# Patient Record
Sex: FEMALE | Race: WHITE | NOT HISPANIC OR LATINO | URBAN - METROPOLITAN AREA
[De-identification: names, ages, dates, MRNs, and addresses within clinical notes are randomized per-mention and may not be internally consistent; named-entity substitution may affect disease eponyms.]

---

## 2023-12-12 PROCEDURE — 99285 EMERGENCY DEPT VISIT HI MDM: CPT | Mod: 25,ER

## 2023-12-12 PROCEDURE — 80053 COMPREHEN METABOLIC PANEL: CPT | Mod: ER

## 2023-12-12 PROCEDURE — 96365 THER/PROPH/DIAG IV INF INIT: CPT | Mod: ER

## 2023-12-12 PROCEDURE — 85025 COMPLETE CBC W/AUTO DIFF WBC: CPT | Mod: ER

## 2023-12-13 ENCOUNTER — HOSPITAL ENCOUNTER (INPATIENT)
Facility: HOSPITAL | Age: 78
LOS: 2 days | Discharge: HOME HEALTH | DRG: 603 | End: 2023-12-17
Attending: EMERGENCY MEDICINE | Admitting: HOSPITALIST
Payer: MEDICARE

## 2023-12-13 DIAGNOSIS — R00.1 BRADYCARDIA WITH 41-50 BEATS PER MINUTE: ICD-10-CM

## 2023-12-13 DIAGNOSIS — R07.9 CHEST PAIN: ICD-10-CM

## 2023-12-13 DIAGNOSIS — I73.9 PAD (PERIPHERAL ARTERY DISEASE): ICD-10-CM

## 2023-12-13 DIAGNOSIS — L03.90 WOUND CELLULITIS: ICD-10-CM

## 2023-12-13 DIAGNOSIS — L03.116 BILATERAL LOWER LEG CELLULITIS: Primary | ICD-10-CM

## 2023-12-13 DIAGNOSIS — L03.115 BILATERAL LOWER LEG CELLULITIS: Primary | ICD-10-CM

## 2023-12-13 PROBLEM — I10 HTN (HYPERTENSION): Status: ACTIVE | Noted: 2023-12-13

## 2023-12-13 PROBLEM — R53.81 DEBILITY: Status: ACTIVE | Noted: 2023-12-13

## 2023-12-13 PROBLEM — H54.3 BLINDNESS, BILATERAL: Status: ACTIVE | Noted: 2023-12-13

## 2023-12-13 LAB
ALBUMIN SERPL BCP-MCNC: 3 G/DL (ref 3.5–5.2)
ALBUMIN SERPL-MCNC: 3.1 G/DL (ref 3.3–5.5)
ALP SERPL-CCNC: 101 U/L (ref 42–141)
ALP SERPL-CCNC: 98 U/L (ref 55–135)
ALT SERPL W/O P-5'-P-CCNC: 10 U/L (ref 10–44)
ANION GAP SERPL CALC-SCNC: 8 MMOL/L (ref 8–16)
ASCENDING AORTA: 2.92 CM
AST SERPL-CCNC: 17 U/L (ref 10–40)
AV INDEX (PROSTH): 0.56
AV MEAN GRADIENT: 9 MMHG
AV PEAK GRADIENT: 16 MMHG
AV VALVE AREA BY VELOCITY RATIO: 1.58 CM²
AV VALVE AREA: 1.74 CM²
AV VELOCITY RATIO: 0.5
BASOPHILS # BLD AUTO: 0.08 K/UL (ref 0–0.2)
BASOPHILS NFR BLD: 1.4 % (ref 0–1.9)
BILIRUB SERPL-MCNC: 0.3 MG/DL (ref 0.1–1)
BILIRUB SERPL-MCNC: 0.5 MG/DL (ref 0.2–1.6)
BSA FOR ECHO PROCEDURE: 1.64 M2
BUN SERPL-MCNC: 22 MG/DL (ref 8–23)
BUN SERPL-MCNC: 23 MG/DL (ref 7–22)
CALCIUM SERPL-MCNC: 8.3 MG/DL (ref 8.7–10.5)
CALCIUM SERPL-MCNC: 9 MG/DL (ref 8–10.3)
CHLORIDE SERPL-SCNC: 107 MMOL/L (ref 95–110)
CHLORIDE SERPL-SCNC: 107 MMOL/L (ref 98–108)
CO2 SERPL-SCNC: 28 MMOL/L (ref 23–29)
CREAT SERPL-MCNC: 0.8 MG/DL (ref 0.5–1.4)
CREAT SERPL-MCNC: 0.8 MG/DL (ref 0.6–1.2)
CV ECHO LV RWT: 0.41 CM
DIFFERENTIAL METHOD: NORMAL
DOP CALC AO PEAK VEL: 1.97 M/S
DOP CALC AO VTI: 47 CM
DOP CALC LVOT AREA: 3.1 CM2
DOP CALC LVOT DIAMETER: 2 CM
DOP CALC LVOT PEAK VEL: 0.99 M/S
DOP CALC LVOT STROKE VOLUME: 81.95 CM3
DOP CALCLVOT PEAK VEL VTI: 26.1 CM
E WAVE DECELERATION TIME: 254.71 MSEC
E/A RATIO: 1.33
E/E' RATIO: 13.86 M/S
ECHO LV POSTERIOR WALL: 0.81 CM (ref 0.6–1.1)
EOSINOPHIL # BLD AUTO: 0.2 K/UL (ref 0–0.5)
EOSINOPHIL NFR BLD: 2.8 % (ref 0–8)
ERYTHROCYTE [DISTWIDTH] IN BLOOD BY AUTOMATED COUNT: 13.4 % (ref 11.5–14.5)
EST. GFR  (NO RACE VARIABLE): >60 ML/MIN/1.73 M^2
FRACTIONAL SHORTENING: 28 % (ref 28–44)
GLUCOSE SERPL-MCNC: 106 MG/DL (ref 70–110)
GLUCOSE SERPL-MCNC: 132 MG/DL (ref 73–118)
GRAM STN SPEC: NORMAL
GRAM STN SPEC: NORMAL
HCT VFR BLD AUTO: 37.2 % (ref 37–48.5)
HCT, POC: NORMAL
HGB BLD-MCNC: 12.1 G/DL (ref 12–16)
HGB, POC: NORMAL (ref 14–18)
IMM GRANULOCYTES # BLD AUTO: 0.01 K/UL (ref 0–0.04)
IMM GRANULOCYTES NFR BLD AUTO: 0.2 % (ref 0–0.5)
INTERVENTRICULAR SEPTUM: 0.86 CM (ref 0.6–1.1)
IVC DIAMETER: 1.69 CM
IVRT: 131.3 MSEC
LA MAJOR: 5.7 CM
LA MINOR: 4.3 CM
LA WIDTH: 4.3 CM
LEFT ATRIUM SIZE: 4.8 CM
LEFT ATRIUM VOLUME INDEX: 53.4 ML/M2
LEFT ATRIUM VOLUME: 86 CM3
LEFT INTERNAL DIMENSION IN SYSTOLE: 2.84 CM (ref 2.1–4)
LEFT VENTRICLE DIASTOLIC VOLUME INDEX: 42.22 ML/M2
LEFT VENTRICLE DIASTOLIC VOLUME: 67.97 ML
LEFT VENTRICLE MASS INDEX: 60 G/M2
LEFT VENTRICLE SYSTOLIC VOLUME INDEX: 19 ML/M2
LEFT VENTRICLE SYSTOLIC VOLUME: 30.56 ML
LEFT VENTRICULAR INTERNAL DIMENSION IN DIASTOLE: 3.95 CM (ref 3.5–6)
LEFT VENTRICULAR MASS: 97.01 G
LV LATERAL E/E' RATIO: 13.86 M/S
LV SEPTAL E/E' RATIO: 13.86 M/S
LVOT MG: 2.28 MMHG
LVOT MV: 0.71 CM/S
LYMPHOCYTES # BLD AUTO: 1.2 K/UL (ref 1–4.8)
LYMPHOCYTES NFR BLD: 20.9 % (ref 18–48)
MCH RBC QN AUTO: 29.4 PG (ref 27–31)
MCH, POC: NORMAL
MCHC RBC AUTO-ENTMCNC: 32.5 G/DL (ref 32–36)
MCHC, POC: NORMAL
MCV RBC AUTO: 91 FL (ref 82–98)
MCV, POC: NORMAL
MONOCYTES # BLD AUTO: 0.6 K/UL (ref 0.3–1)
MONOCYTES NFR BLD: 9.8 % (ref 4–15)
MPV, POC: NORMAL
MV PEAK A VEL: 0.73 M/S
MV PEAK E VEL: 0.97 M/S
MV STENOSIS PRESSURE HALF TIME: 73.87 MS
MV VALVE AREA P 1/2 METHOD: 2.98 CM2
NEUTROPHILS # BLD AUTO: 3.7 K/UL (ref 1.8–7.7)
NEUTROPHILS NFR BLD: 64.9 % (ref 38–73)
NRBC BLD-RTO: 0 /100 WBC
PISA TR MAX VEL: 2.34 M/S
PLATELET # BLD AUTO: 279 K/UL (ref 150–450)
PMV BLD AUTO: 10.2 FL (ref 9.2–12.9)
POC ALT (SGPT): 15 U/L (ref 10–47)
POC AST (SGOT): 27 U/L (ref 11–38)
POC PLATELET COUNT: NORMAL
POC TCO2: 32 MMOL/L (ref 18–33)
POTASSIUM BLD-SCNC: 4.5 MMOL/L (ref 3.6–5.1)
POTASSIUM SERPL-SCNC: 3.8 MMOL/L (ref 3.5–5.1)
PROT SERPL-MCNC: 6.8 G/DL (ref 6–8.4)
PROTEIN, POC: 7 G/DL (ref 6.4–8.1)
PULM VEIN S/D RATIO: 2.31
PV PEAK D VEL: 0.16 M/S
PV PEAK GRADIENT: 3 MMHG
PV PEAK S VEL: 0.37 M/S
PV PEAK VELOCITY: 0.9 M/S
RA MAJOR: 5.19 CM
RA PRESSURE ESTIMATED: 3 MMHG
RA WIDTH: 4.03 CM
RBC # BLD AUTO: 4.11 M/UL (ref 4–5.4)
RBC, POC: NORMAL
RDW, POC: NORMAL
RIGHT VENTRICULAR END-DIASTOLIC DIMENSION: 3.62 CM
RV TB RVSP: 5 MMHG
SINUS: 2.95 CM
SODIUM BLD-SCNC: 144 MMOL/L (ref 128–145)
SODIUM SERPL-SCNC: 143 MMOL/L (ref 136–145)
STJ: 2.5 CM
TDI LATERAL: 0.07 M/S
TDI SEPTAL: 0.07 M/S
TDI: 0.07 M/S
TR MAX PG: 22 MMHG
TRICUSPID ANNULAR PLANE SYSTOLIC EXCURSION: 1.81 CM
TV PEAK GRADIENT: 2 MMHG
TV REST PULMONARY ARTERY PRESSURE: 25 MMHG
WBC # BLD AUTO: 5.69 K/UL (ref 3.9–12.7)
WBC, POC: NORMAL
Z-SCORE OF LEFT VENTRICULAR DIMENSION IN END DIASTOLE: -1.45
Z-SCORE OF LEFT VENTRICULAR DIMENSION IN END SYSTOLE: 0.02

## 2023-12-13 PROCEDURE — 96366 THER/PROPH/DIAG IV INF ADDON: CPT

## 2023-12-13 PROCEDURE — 63600175 PHARM REV CODE 636 W HCPCS

## 2023-12-13 PROCEDURE — 99203 OFFICE O/P NEW LOW 30 MIN: CPT | Mod: ,,, | Performed by: SURGERY

## 2023-12-13 PROCEDURE — 96375 TX/PRO/DX INJ NEW DRUG ADDON: CPT

## 2023-12-13 PROCEDURE — G0378 HOSPITAL OBSERVATION PER HR: HCPCS | Mod: ER

## 2023-12-13 PROCEDURE — 99203 PR OFFICE/OUTPT VISIT, NEW, LEVL III, 30-44 MIN: ICD-10-PCS | Mod: ,,, | Performed by: SURGERY

## 2023-12-13 PROCEDURE — 96367 TX/PROPH/DG ADDL SEQ IV INF: CPT

## 2023-12-13 PROCEDURE — 25000003 PHARM REV CODE 250

## 2023-12-13 PROCEDURE — 87075 CULTR BACTERIA EXCEPT BLOOD: CPT | Performed by: PODIATRIST

## 2023-12-13 PROCEDURE — 99222 1ST HOSP IP/OBS MODERATE 55: CPT | Mod: ,,, | Performed by: PODIATRIST

## 2023-12-13 PROCEDURE — G0378 HOSPITAL OBSERVATION PER HR: HCPCS

## 2023-12-13 PROCEDURE — 25000003 PHARM REV CODE 250: Mod: ER | Performed by: EMERGENCY MEDICINE

## 2023-12-13 PROCEDURE — 80053 COMPREHEN METABOLIC PANEL: CPT

## 2023-12-13 PROCEDURE — 87449 NOS EACH ORGANISM AG IA: CPT

## 2023-12-13 PROCEDURE — 87205 SMEAR GRAM STAIN: CPT | Performed by: PODIATRIST

## 2023-12-13 PROCEDURE — 87040 BLOOD CULTURE FOR BACTERIA: CPT

## 2023-12-13 PROCEDURE — 99222 PR INITIAL HOSPITAL CARE,LEVL II: ICD-10-PCS | Mod: ,,, | Performed by: PODIATRIST

## 2023-12-13 PROCEDURE — 36415 COLL VENOUS BLD VENIPUNCTURE: CPT

## 2023-12-13 PROCEDURE — 87070 CULTURE OTHR SPECIMN AEROBIC: CPT | Performed by: PODIATRIST

## 2023-12-13 PROCEDURE — 85025 COMPLETE CBC W/AUTO DIFF WBC: CPT

## 2023-12-13 PROCEDURE — 63600175 PHARM REV CODE 636 W HCPCS: Performed by: STUDENT IN AN ORGANIZED HEALTH CARE EDUCATION/TRAINING PROGRAM

## 2023-12-13 PROCEDURE — 63600175 PHARM REV CODE 636 W HCPCS: Mod: ER | Performed by: EMERGENCY MEDICINE

## 2023-12-13 PROCEDURE — 25000003 PHARM REV CODE 250: Performed by: STUDENT IN AN ORGANIZED HEALTH CARE EDUCATION/TRAINING PROGRAM

## 2023-12-13 RX ORDER — PROCHLORPERAZINE EDISYLATE 5 MG/ML
5 INJECTION INTRAMUSCULAR; INTRAVENOUS EVERY 6 HOURS PRN
Status: DISCONTINUED | OUTPATIENT
Start: 2023-12-13 | End: 2023-12-17 | Stop reason: HOSPADM

## 2023-12-13 RX ORDER — LOSARTAN POTASSIUM 25 MG/1
50 TABLET ORAL 2 TIMES DAILY
Status: DISCONTINUED | OUTPATIENT
Start: 2023-12-13 | End: 2023-12-17 | Stop reason: HOSPADM

## 2023-12-13 RX ORDER — ONDANSETRON 2 MG/ML
4 INJECTION INTRAMUSCULAR; INTRAVENOUS EVERY 8 HOURS PRN
Status: DISCONTINUED | OUTPATIENT
Start: 2023-12-13 | End: 2023-12-17 | Stop reason: HOSPADM

## 2023-12-13 RX ORDER — IBUPROFEN 200 MG
24 TABLET ORAL
Status: DISCONTINUED | OUTPATIENT
Start: 2023-12-13 | End: 2023-12-17 | Stop reason: HOSPADM

## 2023-12-13 RX ORDER — GLUCAGON 1 MG
1 KIT INJECTION
Status: DISCONTINUED | OUTPATIENT
Start: 2023-12-13 | End: 2023-12-17 | Stop reason: HOSPADM

## 2023-12-13 RX ORDER — SODIUM CHLORIDE 0.9 % (FLUSH) 0.9 %
10 SYRINGE (ML) INJECTION EVERY 12 HOURS PRN
Status: DISCONTINUED | OUTPATIENT
Start: 2023-12-13 | End: 2023-12-17 | Stop reason: HOSPADM

## 2023-12-13 RX ORDER — HYDRALAZINE HYDROCHLORIDE 20 MG/ML
10 INJECTION INTRAMUSCULAR; INTRAVENOUS EVERY 6 HOURS PRN
Status: DISCONTINUED | OUTPATIENT
Start: 2023-12-13 | End: 2023-12-17 | Stop reason: HOSPADM

## 2023-12-13 RX ORDER — ACETAMINOPHEN 325 MG/1
650 TABLET ORAL EVERY 8 HOURS PRN
Status: DISCONTINUED | OUTPATIENT
Start: 2023-12-13 | End: 2023-12-17 | Stop reason: HOSPADM

## 2023-12-13 RX ORDER — LOSARTAN POTASSIUM 25 MG/1
50 TABLET ORAL DAILY
Status: DISCONTINUED | OUTPATIENT
Start: 2023-12-13 | End: 2023-12-13

## 2023-12-13 RX ORDER — IBUPROFEN 200 MG
16 TABLET ORAL
Status: DISCONTINUED | OUTPATIENT
Start: 2023-12-13 | End: 2023-12-17 | Stop reason: HOSPADM

## 2023-12-13 RX ORDER — POLYETHYLENE GLYCOL 3350 17 G/17G
17 POWDER, FOR SOLUTION ORAL DAILY
Status: DISCONTINUED | OUTPATIENT
Start: 2023-12-13 | End: 2023-12-17 | Stop reason: HOSPADM

## 2023-12-13 RX ORDER — TALC
6 POWDER (GRAM) TOPICAL NIGHTLY PRN
Status: DISCONTINUED | OUTPATIENT
Start: 2023-12-13 | End: 2023-12-17 | Stop reason: HOSPADM

## 2023-12-13 RX ORDER — NALOXONE HCL 0.4 MG/ML
0.02 VIAL (ML) INJECTION
Status: DISCONTINUED | OUTPATIENT
Start: 2023-12-13 | End: 2023-12-17 | Stop reason: HOSPADM

## 2023-12-13 RX ORDER — ACETAMINOPHEN 325 MG/1
650 TABLET ORAL EVERY 4 HOURS PRN
Status: DISCONTINUED | OUTPATIENT
Start: 2023-12-13 | End: 2023-12-17 | Stop reason: HOSPADM

## 2023-12-13 RX ADMIN — HYDRALAZINE HYDROCHLORIDE 10 MG: 20 INJECTION, SOLUTION INTRAMUSCULAR; INTRAVENOUS at 05:12

## 2023-12-13 RX ADMIN — LOSARTAN POTASSIUM 50 MG: 25 TABLET, FILM COATED ORAL at 10:12

## 2023-12-13 RX ADMIN — LOSARTAN POTASSIUM 50 MG: 25 TABLET, FILM COATED ORAL at 08:12

## 2023-12-13 RX ADMIN — PIPERACILLIN AND TAZOBACTAM 4.5 G: 4; .5 INJECTION, POWDER, LYOPHILIZED, FOR SOLUTION INTRAVENOUS; PARENTERAL at 12:12

## 2023-12-13 RX ADMIN — POLYETHYLENE GLYCOL 3350 17 G: 17 POWDER, FOR SOLUTION ORAL at 10:12

## 2023-12-13 RX ADMIN — VANCOMYCIN HYDROCHLORIDE 1250 MG: 1.25 INJECTION, POWDER, LYOPHILIZED, FOR SOLUTION INTRAVENOUS at 10:12

## 2023-12-13 RX ADMIN — PIPERACILLIN AND TAZOBACTAM 4.5 G: 4; .5 INJECTION, POWDER, LYOPHILIZED, FOR SOLUTION INTRAVENOUS; PARENTERAL at 06:12

## 2023-12-13 RX ADMIN — HYDRALAZINE HYDROCHLORIDE 10 MG: 20 INJECTION, SOLUTION INTRAMUSCULAR; INTRAVENOUS at 10:12

## 2023-12-13 NOTE — SUBJECTIVE & OBJECTIVE
Past Medical History:   Diagnosis Date    Hypertension        Past Surgical History:   Procedure Laterality Date    EYE SURGERY         Review of patient's allergies indicates:  No Known Allergies    No current facility-administered medications on file prior to encounter.     No current outpatient medications on file prior to encounter.     Family History    None       Tobacco Use    Smoking status: Never    Smokeless tobacco: Never   Substance and Sexual Activity    Alcohol use: Never    Drug use: Never    Sexual activity: Not on file     Review of Systems   Constitutional:  Positive for activity change. Negative for appetite change, chills, fatigue and fever.   HENT:  Positive for rhinorrhea. Negative for congestion, hearing loss, sneezing, sore throat and trouble swallowing.    Eyes:  Positive for visual disturbance. Negative for pain and redness.   Respiratory:  Negative for apnea, chest tightness, shortness of breath and wheezing.    Cardiovascular:  Negative for chest pain, palpitations and leg swelling.   Gastrointestinal:  Negative for abdominal distention, abdominal pain, constipation, diarrhea, nausea and vomiting.   Genitourinary:  Negative for decreased urine volume, difficulty urinating, dysuria and hematuria.   Musculoskeletal:  Positive for gait problem. Negative for arthralgias, back pain and joint swelling.   Skin:  Positive for color change, rash and wound.   Neurological:  Negative for dizziness, speech difficulty, weakness, light-headedness, numbness and headaches.   Psychiatric/Behavioral:  Negative for agitation and behavioral problems.      Objective:     Vital Signs (Most Recent):  Temp: 98.3 °F (36.8 °C) (12/13/23 0640)  Pulse: (!) 45 (12/13/23 0640)  Resp: 16 (12/13/23 0640)  BP: (!) 176/100 (12/13/23 0640)  SpO2: 98 % (12/13/23 0640) Vital Signs (24h Range):  Temp:  [97.9 °F (36.6 °C)-98.3 °F (36.8 °C)] 98.3 °F (36.8 °C)  Pulse:  [45-83] 45  Resp:  [16-20] 16  SpO2:  [95 %-99 %] 98 %  BP:  (164-206)/() 176/100     Weight: 63.7 kg (140 lb 6.9 oz)  Body mass index is 27.43 kg/m².     Physical Exam  Vitals reviewed.   Constitutional:       General: She is not in acute distress.     Appearance: Normal appearance. She is normal weight. She is not ill-appearing.   HENT:      Head: Normocephalic and atraumatic.      Right Ear: External ear normal.      Left Ear: External ear normal.      Nose: Nose normal.      Mouth/Throat:      Dentition: Abnormal dentition.      Pharynx: Oropharynx is clear.      Comments: Poor dentition  Eyes:      General:         Right eye: No discharge.         Left eye: No discharge.      Extraocular Movements: Extraocular movements intact.      Conjunctiva/sclera: Conjunctivae normal.   Cardiovascular:      Rate and Rhythm: Normal rate. Rhythm irregular.      Comments: Telemetry personally reviewed with HR: 79-81.  Pulmonary:      Effort: Pulmonary effort is normal. No respiratory distress.      Breath sounds: Normal breath sounds. No wheezing.   Abdominal:      General: Bowel sounds are normal. There is no distension.      Palpations: Abdomen is soft.      Tenderness: There is no abdominal tenderness.   Musculoskeletal:         General: No swelling or tenderness. Normal range of motion.      Cervical back: Normal range of motion.      Right lower leg: No edema.      Left lower leg: No edema.   Skin:     General: Skin is warm.      Findings: Erythema, rash and wound present.      Comments: Bilateral lower extremity erythema and warmth noted on exam.  Large fluid filled blister noted on medial aspect of right leg  Onychauxis noted.   Neurological:      General: No focal deficit present.      Mental Status: She is alert and oriented to person, place, and time. Mental status is at baseline.   Psychiatric:         Mood and Affect: Mood normal.         Behavior: Behavior normal.                Significant Labs: All pertinent labs within the past 24 hours have been  reviewed.  Bilirubin:   Recent Labs   Lab 12/13/23  0643   BILITOT 0.3       BMP:   Recent Labs   Lab 12/13/23  0643         K 3.8      CO2 28   BUN 22   CREATININE 0.8   CALCIUM 8.3*     CBC:   Recent Labs   Lab 12/13/23  0643   WBC 5.69   HGB 12.1   HCT 37.2        CMP:   Recent Labs   Lab 12/13/23  0643      K 3.8      CO2 28      BUN 22   CREATININE 0.8   CALCIUM 8.3*   PROT 6.8   ALBUMIN 3.0*   BILITOT 0.3   ALKPHOS 98   AST 17   ALT 10   ANIONGAP 8       Significant Imaging: I have reviewed all pertinent imaging results/findings within the past 24 hours.

## 2023-12-13 NOTE — CONSULTS
[facility] - Telemetry  Wound Care  [facility]    Patient Name:  Elena Xiao   MRN:  [id number]  Date: 12/13/2023  Diagnosis: Bilateral lower leg cellulitis    History:     Past Medical History:   Diagnosis Date    Hypertension        Social History     Socioeconomic History    Marital status: Unknown   Tobacco Use    Smoking status: Never    Smokeless tobacco: Never   Substance and Sexual Activity    Alcohol use: Never    Drug use: Never       Precautions:     Allergies as of 12/12/2023    (Not on File)       WO Assessment Details/Treatment     Active Problem List with Overview Notes    Diagnosis Date Noted    Bilateral lower leg cellulitis 12/13/2023    HTN (hypertension) 12/13/2023    Blindness, bilateral 12/13/2023    Asymptomatic bradycardia 12/13/2023    Debility 12/13/2023      Consulted for altered skin integrity cellulitis to lower extremities  A 78 year old female admitted to OBS today from home with complaint of redness and swelling to bilateral lower extremities for one week  12/13 WBC 5.69 Hgb 12.1 Hct 37.2 Alb 3.0 Weight 140 lbs   On Isoflex mattress; Leroy score 17  12/13 7:54 am 4 Eyes Skin Assessment- No Pressure Injuries present on admit  Podiatry consult- Bullae right medial lower leg; drained and culture obtained; treatment plan- paint with betadine and leave open to air  Vascular consult- Does not appear to be vascular etiology; signed off  Assessment/Plan:  Photodocumentation (from Media)        Bilateral lower legs- erythema  Evaluate legs 12/14. Assist nursing as needed with wound management.  Pressure Injury Prevention Interventions per nursing  Recommendations made to primary team. Orders placed.     12/13/2023

## 2023-12-13 NOTE — ED NOTES
MD aware of pt elevated BP readings. Pt remains asymptomatic of BP reading at this time, denies any pain. Pt states she does not take any BP medications at home. No new orders provided at this time.

## 2023-12-13 NOTE — ASSESSMENT & PLAN NOTE
Patient presented to the [facility] ED c/o of erythema and edema of her bilateral lower extremities x 1 week as well as a large blister on her right medial lower extremity x 2 days.  She stated that she noticed a burning sensation in her legs, which prompted her ED visit.   She states that she has not seen a doctor since 2020, and states that the last time she cut her toenails was back in 2019  Patient currently lives with her daughter who assists patient around the house.  Lower extremity US ordered which is negative for DVTs.  Administered IV Zosyn in the ED x 1  Blood cultures obtained x 2  Continue IV Vanc and Zosyn  Elevate lower extremities  Podiatry and Wound Care consulted; appreciate recommendations.

## 2023-12-13 NOTE — NURSING
[facility] [place]  Nurses Note -- 4 Eyes      12/13/2023       Skin assessed on: Admit      [x] No Pressure Injuries Present    []Prevention Measures Documented    [] Yes LDA  for Pressure Injury Previously documented     [] Yes New Pressure Injury Discovered   [] LDA for New Pressure Injury Added      Attending RN:  Danyelle Oneill LPN     Second RN:  Susan Carrillo RN

## 2023-12-13 NOTE — CONSULTS
[facility] [place] - Telemetry  Podiatry  Consult Note    Patient Name: Elena Xiao  MRN: [id number]  Admission Date: 12/13/2023  Hospital Length of Stay: 0 days  Attending Physician: Silvino Crisostomo III, MD  Primary Care Provider: Kady, Primary Doctor     Inpatient consult to Podiatry  Consult performed by: Yoanna Lazo DPM  Consult ordered by: Andrea Drummond PA-C        Subjective:     History of Present Illness: 79 y/o female PMH HT, blindness admitted for BLE cellulitis. Reports noticing blister to right leg x several days.     Scheduled Meds:   losartan  50 mg Oral Daily    piperacillin-tazobactam (Zosyn) IV (PEDS and ADULTS) (extended infusion is not appropriate)  4.5 g Intravenous Q8H    polyethylene glycol  17 g Oral Daily    [START ON 12/14/2023] vancomycin (VANCOCIN) IV (PEDS and ADULTS)  1,250 mg Intravenous Q24H     Continuous Infusions:  PRN Meds:acetaminophen, acetaminophen, dextrose 10%, dextrose 10%, glucagon (human recombinant), glucose, glucose, hydrALAZINE, melatonin, naloxone, ondansetron, prochlorperazine, sodium chloride 0.9%, Pharmacy to dose Vancomycin consult **AND** vancomycin - pharmacy to dose    Review of patient's allergies indicates:  No Known Allergies     Past Medical History:   Diagnosis Date    Hypertension      Past Surgical History:   Procedure Laterality Date    EYE SURGERY         Family History    None       Tobacco Use    Smoking status: Never    Smokeless tobacco: Never   Substance and Sexual Activity    Alcohol use: Never    Drug use: Never    Sexual activity: Not on file     Review of Systems   Constitutional: Negative.    Respiratory: Negative.     Genitourinary: Negative.    Musculoskeletal:  Positive for arthralgias.   Skin:  Positive for color change and wound.   Neurological: Negative.      Objective:     Vital Signs (Most Recent):  Temp: 98.2 °F (36.8 °C) (12/13/23 1126)  Pulse: 73 (12/13/23 1507)  Resp: 18 (12/13/23 1126)  BP: (!) 176/79 (12/13/23 1507)  SpO2: 99  % (12/13/23 1126) Vital Signs (24h Range):  Temp:  [97.9 °F (36.6 °C)-98.3 °F (36.8 °C)] 98.2 °F (36.8 °C)  Pulse:  [45-83] 73  Resp:  [16-20] 18  SpO2:  [95 %-99 %] 99 %  BP: (164-206)/() 176/79     Weight: 63.7 kg (140 lb 6.9 oz)  Body mass index is 27.43 kg/m².    Foot Exam    General  Orientation: alert and oriented to person, place, and time       Right Foot/Ankle     Inspection and Palpation  Skin Exam: skin changes;     Neurovascular  Dorsalis pedis: 1+  Posterior tibial: 1+  Saphenous nerve sensation: diminished  Tibial nerve sensation: diminished  Superficial peroneal nerve sensation: diminished  Deep peroneal nerve sensation: diminished  Sural nerve sensation: diminished      Left Foot/Ankle      Inspection and Palpation  Skin Exam: no ulcer     Neurovascular  Dorsalis pedis: 1+  Posterior tibial: 1+  Saphenous nerve sensation: diminished  Tibial nerve sensation: diminished  Superficial peroneal nerve sensation: diminished  Deep peroneal nerve sensation: diminished  Sural nerve sensation: diminished        12/13/23:  Bullae formation right medial leg   Mycotic nails 1-5 B/L               Laboratory:  CBC:   Recent Labs   Lab 12/13/23  0643   WBC 5.69   RBC 4.11   HGB 12.1   HCT 37.2      MCV 91   MCH 29.4   MCHC 32.5     CMP:   Recent Labs   Lab 12/13/23  0643      CALCIUM 8.3*   ALBUMIN 3.0*   PROT 6.8      K 3.8   CO2 28      BUN 22   CREATININE 0.8   ALKPHOS 98   ALT 10   AST 17   BILITOT 0.3       Diagnostic Results:  Arterial US: ordered     Clinical Findings:  Bullae formation right leg B/L LE erythema     Assessment/Plan:     Active Diagnoses:    Diagnosis Date Noted POA    PRINCIPAL PROBLEM:  Bilateral lower leg cellulitis [L03.116, L03.115] 12/13/2023 Yes    HTN (hypertension) [I10] 12/13/2023 Yes    Blindness, bilateral [H54.3] 12/13/2023 Yes    Asymptomatic bradycardia [R00.1] 12/13/2023 Yes    Debility [R53.81] 12/13/2023 Yes      Problems Resolved During this  Admission:       With patient's permission right medial leg bullae formation drained. Culture obtained.     Arterial US ordered    Vascular surgery consulted.     Right leg Painted with betadine left open to air  Thank you for your consult. I will follow-up with patient. Please contact us if you have any additional questions.    Yoanna Lazo DPM  Podiatry  [facility] [place] - Telemetry

## 2023-12-13 NOTE — PLAN OF CARE
Attempted to contact pts relative Dara to discuss consult entered.  No answer at this time and not able to leave voice mail as mailbox is full.  TN to attempt to contact at a later time

## 2023-12-13 NOTE — HPI
"Ms. Xiao is a 78 yr old female with medical  hx of HTN who presented from the [facility] ED for c/o BLE swelling, erythema x 1 week, along with a fluid filled blister on her RLE x 2 days. She stated that today she noted burning of her BLE, which prompted her ED visit. 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 She has not cut her toenails since 2019 and has not seen her PCP since 2020. She states "I have never taken prescription medication my whole life." She endorses that she was previously prescribed HTN medication, but states she didn't take the medication because it made her dizzy. Along with her lower extremity erythema, and pain, she endorses rhinorrhea, but denies any headaches, chest pain, shortness of breath, sore throat, trouble swallowing, nausea, vomiting, abdominal pain, dysuria, hematuria and/or any other associated symptoms. She denies any known drug allergies.    In the ED:  Patient is afebrile without leukocytosis, hypertensive, CBC and CMP without abnormalities. Administered IV Zosyn x 1. Lower Extremity US noted to be negative for DVTs.    Elena Xiao was placed under observation for further management of lower extremity cellulitis and podiatric evaluation.  "

## 2023-12-13 NOTE — ADMISSIONCARE
AdmissionCare    Guideline: Cellulitis - OBS, Observation    Based on the indications selected for the patient, the bed status of Admit to Observation was determined to be MET    The following indications were selected as present at the time of evaluation of the patient:      - Severity of infection unclear (eg, concern regarding rapid progression)    AdmissionCare documentation entered by: Fabi Coker    [facility], 27th edition, Copyright © 2023 [facility] All Rights Reserved.  [id number]

## 2023-12-13 NOTE — ED PROVIDER NOTES
Encounter Date: 12/12/2023    SCRIBE #1 NOTE: I, Paul Cabrera, am scribing for, and in the presence of,  Milton Pedroza MD. I have scribed the following portions of the note - Other sections scribed: HPI,ROS,PE.       History     Chief Complaint   Patient presents with    LOWER EXTREMITY PROBLEM     PT REPORTS REDNESS AND SWELLING TO BILAT LOWER EXTS FOR 1 WEEK, REPORTS HAD SMALL BLISTER TO RLE BUT NOW BLISTER IS VERY LARGE     Elena Xiao is a 78 y.o. female, with a PMHx of HTN, who presents to the ED with redness and swelling to bilateral lower extremities onset 1 week ago. Patient reports that she noticed the redness and blistering, then swelling began. Patient states that today she began to feel a burning sensation in her legs which is what brought her into the ED today. Patient states that she can feel fluid moving in her legs as she ambulates. Patient notes that the last time she cut her toe nails was in 2019. No other exacerbating or alleviating factors. Patient has no other complaints at the present time.     The history is provided by the patient. No  was used.     Review of patient's allergies indicates:  No Known Allergies  Past Medical History:   Diagnosis Date    Hypertension      Past Surgical History:   Procedure Laterality Date    EYE SURGERY       No family history on file.  Social History     Tobacco Use    Smoking status: Never    Smokeless tobacco: Never   Substance Use Topics    Alcohol use: Never    Drug use: Never     Review of Systems   Constitutional: Negative.  Negative for fever.   HENT: Negative.  Negative for sore throat.    Eyes: Negative.    Respiratory: Negative.  Negative for shortness of breath.    Cardiovascular: Negative.  Negative for chest pain.   Gastrointestinal: Negative.  Negative for nausea and vomiting.   Endocrine: Negative.    Genitourinary: Negative.  Negative for dysuria.   Musculoskeletal: Negative.  Negative for myalgias.   Skin:   Positive for color change (redness) and wound (to bilateral feet). Negative for rash.   Allergic/Immunologic: Negative.    Neurological: Negative.  Negative for headaches.   Hematological: Negative.  Negative for adenopathy.   Psychiatric/Behavioral: Negative.  Negative for behavioral problems.    All other systems reviewed and are negative.      Physical Exam     Initial Vitals [12/12/23 2356]   BP Pulse Resp Temp SpO2   (!) 206/70 74 20 97.9 °F (36.6 °C) 99 %      MAP       --         Physical Exam    Nursing note and vitals reviewed.  Constitutional: She appears well-developed and well-nourished.   HENT:   Head: Normocephalic and atraumatic.   Right Ear: External ear normal.   Left Ear: External ear normal.   Nose: Nose normal.   Eyes: Conjunctivae are normal.   Neck: Neck supple.   Normal range of motion.  Cardiovascular:  Normal rate, regular rhythm, normal heart sounds, intact distal pulses and normal pulses.     Exam reveals no gallop and no friction rub.       No murmur heard.  Pulmonary/Chest: Effort normal and breath sounds normal. No respiratory distress.   Abdominal: Abdomen is soft and flat. There is no abdominal tenderness.   Musculoskeletal:         General: Normal range of motion.      Cervical back: Normal range of motion and neck supple.      Comments: DP pulses are good.     Neurological: She is alert and oriented to person, place, and time.   Skin: Skin is warm and dry. Capillary refill takes less than 2 seconds. There is erythema.   Feet are warm not cold with good capillary refill. Erythematous legs bilaterally from knee down to feet. There is a large blister with transudative fluid to the right leg. Trigonum to bilateral feet. Red hot feet.   Psychiatric: She has a normal mood and affect. Her behavior is normal.         ED Course   Procedures  Labs Reviewed   POCT CMP - Abnormal; Notable for the following components:       Result Value    POC BUN 23 (*)     POC Glucose 132 (*)     All other  components within normal limits   POCT CBC   POCT CMP          Imaging Results              US Lower Extremity Veins Bilateral (Final result)  Result time 12/13/23 01:23:30      Final result by Qing Ozuna MD (12/13/23 01:23:30)                   Impression:      No evidence of lower extremity deep venous thrombosis.      Electronically signed by: Qing Ozuna MD  Date:    12/13/2023  Time:    01:23               Narrative:    EXAMINATION:  US LOWER EXTREMITY VEINS BILATERAL    CLINICAL HISTORY:  Cellulitis, unspecified    TECHNIQUE:  Duplex and color flow Doppler evaluation of the bilateral lower extremity veins was performed.    COMPARISON:  None    FINDINGS:  No evidence of clot involving the bilateral common femoral, greater saphenous, femoral, popliteal, peroneal, anterior and posterior tibial veins.  All venous structures demonstrate normal respiratory phasicity and augment adequately.  No evidence of soft tissue mass or Baker's cyst.                                       Medications   piperacillin-tazobactam (ZOSYN) 4.5 g in dextrose 5 % in water (D5W) 100 mL IVPB (MB+) (0 g Intravenous Stopped 12/13/23 0120)     Medical Decision Making  This patient has signs and symptoms of cellulitis in her lower extremities.  There was no evidence of DVT by ultrasound.  Patient received IV antibiotic therapy here in the emergency department subsequently will be admitted to the hospital for continue of care.  White count is not elevated laboratory otherwise no significant abnormalities.    Amount and/or Complexity of Data Reviewed  Labs: ordered. Decision-making details documented in ED Course.  Radiology:  Decision-making details documented in ED Course.            Scribe Attestation:   Scribe #1: I performed the above scribed service and the documentation accurately describes the services I performed. I attest to the accuracy of the note.              This document was produced by a scribe under my direction and  in my presence. I agree with the content of the note and have made any necessary edits.     Milton Pedroza MD    12/13/2023 2:20 AM                    Clinical Impression:  Final diagnoses:  [L03.90] Wound cellulitis          ED Disposition Condition    Observation                 Milton Pedroza MD  12/13/23 0220

## 2023-12-13 NOTE — NURSING
Wound culture sent to lab for processing.   Seen and examined by me. Obese former smoker who carries dx of COPD, noncompliant w inhalers, and dx of DALIA, noncompliant w CPAP, admitted w a week of increasing dyspnea associated w fever. Had a normal ABG in Feb and now is in hypercapneic respiratory failure. . Covid negative x 1. Sleeping but easily arousable and alert and lucid when we speak to her. Wheezing on exam. COPD exacerbation - rec steroids and BiPAP. Would check another ABG today and have low threshold to transfer to a monitored setting for worsening acidemia. Will follow w you.

## 2023-12-13 NOTE — ASSESSMENT & PLAN NOTE
Chronic, uncontrolled. Latest blood pressure and vitals reviewed-     Temp:  [97.9 °F (36.6 °C)-98.3 °F (36.8 °C)]   Pulse:  [45-83]   Resp:  [16-20]   BP: (164-206)/()   SpO2:  [95 %-99 %] .   Home meds for hypertension were reviewed and noted below.       While in the hospital, will manage blood pressure as follows; Losartan added to regimen.    Will utilize p.r.n. blood pressure medication only if patient's blood pressure greater than 180/110 and she develops symptoms such as worsening chest pain or shortness of breath.    Patient states she was previously prescribed a medication for HTN, but never continued it due to it making her feel light headed.

## 2023-12-13 NOTE — ASSESSMENT & PLAN NOTE
Patient noted to have HR: 45-47 upon admission to the floor, but not in any distress.  Echocardiogram ordered  Maintain on telemetry.

## 2023-12-13 NOTE — ASSESSMENT & PLAN NOTE
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  SW consulted for family assessment and discharge planning.

## 2023-12-13 NOTE — NURSING
[facility] [place]  Nurses Note -- 4 Eyes      12/13/2023       Skin assessed on: Q Shift      [x] No Pressure Injuries Present    [x]Prevention Measures Documented    [] Yes LDA  for Pressure Injury Previously documented     [] Yes New Pressure Injury Discovered   [] LDA for New Pressure Injury Added      Attending RN:  Socorro Sanderson LPN     Second RN:  Danyelle OneillRN

## 2023-12-13 NOTE — CONSULTS
"[facility] [place] - Telemetry  Vascular Surgery  Consult Note    Inpatient consult to Vascular Surgery  Consult performed by: Alonso Yeboah MD  Consult ordered by: Yoanna Lazo DPM  Reason for consult: leg wounds        Subjective:     Chief Complaint/Reason for Admission: BLE cellulitis    History of Present Illness: 78 F hx of blindness presenting with BLE cellulitis with large bullae over right leg.  Toenails also noted to be highly overgrown.  No fever or chills    No medications prior to admission.       Review of patient's allergies indicates:  No Known Allergies    Past Medical History:   Diagnosis Date    Hypertension      Past Surgical History:   Procedure Laterality Date    EYE SURGERY       Family History    None       Tobacco Use    Smoking status: Never    Smokeless tobacco: Never   Substance and Sexual Activity    Alcohol use: Never    Drug use: Never    Sexual activity: Not on file     Review of Systems  Objective:     Vital Signs (Most Recent):  Temp: 98.2 °F (36.8 °C) (12/13/23 1126)  Pulse: 73 (12/13/23 1507)  Resp: 18 (12/13/23 1126)  BP: (!) 176/79 (12/13/23 1507)  SpO2: 99 % (12/13/23 1126) Vital Signs (24h Range):  Temp:  [97.9 °F (36.6 °C)-98.3 °F (36.8 °C)] 98.2 °F (36.8 °C)  Pulse:  [45-83] 73  Resp:  [16-20] 18  SpO2:  [95 %-99 %] 99 %  BP: (164-206)/() 176/79     Weight: 63.7 kg (140 lb 6.9 oz)  Body mass index is 27.43 kg/m².        Physical Exam  2+ palpable DP pulses  No wounds on feet      Significant Labs:  Coagulation: No results for input(s): "LABPROT", "INR", "APTT" in the last 48 hours.  eGFR: No results for input(s): "EGFRIFAFRICA", "EGFRCYSTC", "LABGLOM" in the last 48 hours.    Invalid input(s): "EGFRNON-AA"  Hemoglobin: No results for input(s): "HGBA1C" in the last 48 hours.  Lactic Acid: No results for input(s): "LACTATE" in the last 48 hours.    Significant Diagnostics:  U/S: No results found in the last 24 hours.    Assessment/Plan:     Active Diagnoses:    " Diagnosis Date Noted POA    PRINCIPAL PROBLEM:  Bilateral lower leg cellulitis [L03.116, L03.115] 12/13/2023 Yes    HTN (hypertension) [I10] 12/13/2023 Yes    Blindness, bilateral [H54.3] 12/13/2023 Yes    Asymptomatic bradycardia [R00.1] 12/13/2023 Yes    Debility [R53.81] 12/13/2023 Yes      Problems Resolved During this Admission:     BLE cellulitis - does not appear to be vascular etiology.  [place] pedal pulses, no DVT on duplex.  Recommend abx with antifungal     Thank you for your consult. I will sign off. Please contact us if you have any additional questions.    Alonso Yeboah MD  Vascular Surgery  [facility] - Telemetry

## 2023-12-13 NOTE — H&P
"  [facility] Medicine  History & Physical    Patient Name: Elena Xiao  MRN: [id number]  Patient Class: OP- Observation  Admission Date: 12/13/2023  Attending Physician: Silvino Crisostomo III, MD   Primary Care Provider: Kady, Primary Doctor         Patient information was obtained from patient, past medical records, and ER records.     Subjective:     Principal Problem:HTN (hypertension)    Chief Complaint:   Chief Complaint   Patient presents with    LOWER EXTREMITY PROBLEM     PT REPORTS REDNESS AND SWELLING TO BILAT LOWER EXTS FOR 1 WEEK, REPORTS HAD SMALL BLISTER TO RLE BUT NOW BLISTER IS VERY LARGE        HPI: Ms. Xiao is a 78 yr old female with medical  hx of HTN who presented from the [facility] ED for c/o BLE swelling, erythema x 1 week, along with a fluid filled blister on her RLE x 2 days. She stated that today she noted burning of her BLE, which prompted her ED visit. 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 She has not cut her toenails since 2019 and has not seen her PCP since 2020. She states "I have never taken prescription medication my whole life." She endorses that she was previously prescribed HTN medication, but states she didn't take the medication because it made her dizzy. Along with her lower extremity erythema, and pain, she endorses rhinorrhea, but denies any headaches, chest pain, shortness of breath, sore throat, trouble swallowing, nausea, vomiting, abdominal pain, dysuria, hematuria and/or any other associated symptoms. She denies any known drug allergies.    In the ED:  Patient is afebrile without leukocytosis, hypertensive, CBC and CMP without abnormalities. Administered IV Zosyn x 1. Lower Extremity US noted to be negative for DVTs.    Elena Xiao was placed under observation for further management of lower extremity cellulitis and podiatric evaluation.    Past Medical " History:   Diagnosis Date    Hypertension        Past Surgical History:   Procedure Laterality Date    EYE SURGERY         Review of patient's allergies indicates:  No Known Allergies    No current facility-administered medications on file prior to encounter.     No current outpatient medications on file prior to encounter.     Family History    None       Tobacco Use    Smoking status: Never    Smokeless tobacco: Never   Substance and Sexual Activity    Alcohol use: Never    Drug use: Never    Sexual activity: Not on file     Review of Systems   Constitutional:  Positive for activity change. Negative for appetite change, chills, fatigue and fever.   HENT:  Positive for rhinorrhea. Negative for congestion, hearing loss, sneezing, sore throat and trouble swallowing.    Eyes:  Positive for visual disturbance. Negative for pain and redness.   Respiratory:  Negative for apnea, chest tightness, shortness of breath and wheezing.    Cardiovascular:  Negative for chest pain, palpitations and leg swelling.   Gastrointestinal:  Negative for abdominal distention, abdominal pain, constipation, diarrhea, nausea and vomiting.   Genitourinary:  Negative for decreased urine volume, difficulty urinating, dysuria and hematuria.   Musculoskeletal:  Positive for gait problem. Negative for arthralgias, back pain and joint swelling.   Skin:  Positive for color change, rash and wound.   Neurological:  Negative for dizziness, speech difficulty, weakness, light-headedness, numbness and headaches.   Psychiatric/Behavioral:  Negative for agitation and behavioral problems.      Objective:     Vital Signs (Most Recent):  Temp: 98.3 °F (36.8 °C) (12/13/23 0640)  Pulse: (!) 45 (12/13/23 0640)  Resp: 16 (12/13/23 0640)  BP: (!) 176/100 (12/13/23 0640)  SpO2: 98 % (12/13/23 0640) Vital Signs (24h Range):  Temp:  [97.9 °F (36.6 °C)-98.3 °F (36.8 °C)] 98.3 °F (36.8 °C)  Pulse:  [45-83] 45  Resp:  [16-20] 16  SpO2:  [95 %-99 %] 98 %  BP:  (164-206)/() 176/100     Weight: 63.7 kg (140 lb 6.9 oz)  Body mass index is 27.43 kg/m².     Physical Exam  Vitals reviewed.   Constitutional:       General: She is not in acute distress.     Appearance: Normal appearance. She is normal weight. She is not ill-appearing.   HENT:      Head: Normocephalic and atraumatic.      Right Ear: External ear normal.      Left Ear: External ear normal.      Nose: Nose normal.      Mouth/Throat:      Dentition: Abnormal dentition.      Pharynx: Oropharynx is clear.      Comments: Poor dentition  Eyes:      General:         Right eye: No discharge.         Left eye: No discharge.      Extraocular Movements: Extraocular movements intact.      Conjunctiva/sclera: Conjunctivae normal.   Cardiovascular:      Rate and Rhythm: Normal rate. Rhythm irregular.      Comments: Telemetry personally reviewed with HR: 79-81.  Pulmonary:      Effort: Pulmonary effort is normal. No respiratory distress.      Breath sounds: Normal breath sounds. No wheezing.   Abdominal:      General: Bowel sounds are normal. There is no distension.      Palpations: Abdomen is soft.      Tenderness: There is no abdominal tenderness.   Musculoskeletal:         General: No swelling or tenderness. Normal range of motion.      Cervical back: Normal range of motion.      Right lower leg: No edema.      Left lower leg: No edema.   Skin:     General: Skin is warm.      Findings: Erythema, rash and wound present.      Comments: Bilateral lower extremity erythema and warmth noted on exam.  Large fluid filled blister noted on medial aspect of right leg  Onychauxis noted.   Neurological:      General: No focal deficit present.      Mental Status: She is alert and oriented to person, place, and time. Mental status is at baseline.   Psychiatric:         Mood and Affect: Mood normal.         Behavior: Behavior normal.                Significant Labs: All pertinent labs within the past 24 hours have been  "reviewed.  Bilirubin:   Recent Labs   Lab 12/13/23  0643   BILITOT 0.3       BMP:   Recent Labs   Lab 12/13/23  0643         K 3.8      CO2 28   BUN 22   CREATININE 0.8   CALCIUM 8.3*     CBC:   Recent Labs   Lab 12/13/23  0643   WBC 5.69   HGB 12.1   HCT 37.2        CMP:   Recent Labs   Lab 12/13/23  0643      K 3.8      CO2 28      BUN 22   CREATININE 0.8   CALCIUM 8.3*   PROT 6.8   ALBUMIN 3.0*   BILITOT 0.3   ALKPHOS 98   AST 17   ALT 10   ANIONGAP 8       Significant Imaging: I have reviewed all pertinent imaging results/findings within the past 24 hours.  Assessment/Plan:     * HTN (hypertension)  Chronic, uncontrolled. Latest blood pressure and vitals reviewed-     Temp:  [97.9 °F (36.6 °C)-98.3 °F (36.8 °C)]   Pulse:  [45-83]   Resp:  [16-20]   BP: (164-206)/()   SpO2:  [95 %-99 %] .   Home meds for hypertension were reviewed and noted below.       While in the hospital, will manage blood pressure as follows; Losartan added to regimen.    Will utilize p.r.n. blood pressure medication only if patient's blood pressure greater than 180/110 and she develops symptoms such as worsening chest pain or shortness of breath.    Patient states she was previously prescribed a medication for HTN, but never continued it due to it making her feel light headed.    Debility  Patient states she is partially blind in both eyes and uses a walker to ambulate.  SW consulted for family assessment and discharge planning.    Asymptomatic bradycardia  Patient noted to have HR: 45-47 upon admission to the floor, but not in any distress.  Echocardiogram ordered  Maintain on telemetry.    Blindness, bilateral  Noted on exam; patient states she is "partially blind in both eyes and can only see light."      Bilateral lower leg cellulitis  Patient presented to the [facility] ED c/o of erythema and edema of her bilateral lower extremities x 1 week as well as a large blister on her " right medial lower extremity x 2 days.  She stated that she noticed a burning sensation in her legs, which prompted her ED visit.   She states that she has not seen a doctor since 2020, and states that the last time she cut her toenails was back in 2019  Patient currently lives with her daughter who assists patient around the house.  Lower extremity US ordered which is negative for DVTs.  Administered IV Zosyn in the ED x 1  Blood cultures obtained x 2  Continue IV Vanc and Zosyn  Elevate lower extremities  Podiatry and Wound Care consulted; appreciate recommendations.      VTE Risk Mitigation (From admission, onward)           Ordered     IP VTE HIGH RISK PATIENT  Once         12/13/23 0634     Place sequential compression device  Until discontinued         12/13/23 0634                   On 12/13/2023, patient should be placed in hospital observation services under my care in collaboration with Silvino Crisostomo III, MD.      Pharmacokinetic Initial Assessment: IV Vancomycin    Assessment/Plan:    Initiate intravenous vancomycin with loading dose of 1250 mg once followed by a maintenance dose of vancomycin 1250 mg IV every 24 hours  Desired empiric serum trough concentration is 10 to 20 mcg/mL  Draw vancomycin trough level 60 min prior to third dose on 12-15-23 at approximately 0930.  Pharmacy will continue to follow and monitor vancomycin.      Please contact pharmacy at extension [phone] with any questions regarding this assessment.     Thank you for the consult,   Sheree Pineda       Patient brief summary:  Elena iXao is a 78 y.o. female initiated on antimicrobial therapy with IV Vancomycin for treatment of suspected skin & soft tissue infection    Drug Allergies:   Review of patient's allergies indicates:  No Known Allergies    Actual Body Weight:   63.7 kg    Renal Function:   Estimated Creatinine Clearance: 49.9 mL/min (based on SCr of 0.8 mg/dL).,     Dialysis Method (if applicable):  N/A    CBC (last  "72 hours):  Recent Labs   Lab Result Units 12/13/23  0643   WBC K/uL 5.69   Hemoglobin g/dL 12.1   Hematocrit % 37.2   Platelets K/uL 279   Gran % % 64.9   Lymph % % 20.9   Mono % % 9.8   Eosinophil % % 2.8   Basophil % % 1.4   Differential Method  Automated       Metabolic Panel (last 72 hours):  Recent Labs   Lab Result Units 12/13/23  0643   Sodium mmol/L 143   Potassium mmol/L 3.8   Chloride mmol/L 107   CO2 mmol/L 28   Glucose mg/dL 106   BUN mg/dL 22   Creatinine mg/dL 0.8   Albumin g/dL 3.0*   Total Bilirubin mg/dL 0.3   Alkaline Phosphatase U/L 98   AST U/L 17   ALT U/L 10       Drug levels (last 3 results):  No results for input(s): "VANCOMYCINRA", "VANCORANDOM", "VANCOMYCINPE", "VANCOPEAK", "VANCOMYCINTR", "VANCOTROUGH" in the last 72 hours.    Microbiologic Results:  Microbiology Results (last 7 days)       Procedure Component Value Units Date/Time    Blood culture [[id number]] Collected: 12/13/23 0733    Order Status: Sent Specimen: Blood from Antecubital, Right Hand Updated: 12/13/23 0753    Blood culture [[id number]] Collected: 12/13/23 0733    Order Status: Sent Specimen: Blood from Hand Updated: 12/13/23 0753              AdmissionCare    Guideline: Cellulitis - OBS, Observation    Based on the indications selected for the patient, the bed status of Admit to Observation was determined to be MET    The following indications were selected as present at the time of evaluation of the patient:      - Severity of infection unclear (eg, concern regarding rapid progression)    AdmissionCare documentation entered by: Fabi Coker    [facility] [employer], 27th edition, Copyright © 2023 [facility] [employer], [facility] All Rights Reserved.  [id number]:00      Andrea Drummond PA-C  Department of Hospital Medicine  [facility]  12/13/2023  "

## 2023-12-13 NOTE — PROGRESS NOTES
"Pharmacokinetic Initial Assessment: IV Vancomycin    Assessment/Plan:    Initiate intravenous vancomycin with loading dose of 1250 mg once followed by a maintenance dose of vancomycin 1250 mg IV every 24 hours  Desired empiric serum trough concentration is 10 to 20 mcg/mL  Draw vancomycin trough level 60 min prior to third dose on 12-15-23 at approximately 0930.  Pharmacy will continue to follow and monitor vancomycin.      Please contact pharmacy at extension [phone] with any questions regarding this assessment.     Thank you for the consult,   Sheree Edwin       Patient brief summary:  Elena Xiao is a 78 y.o. female initiated on antimicrobial therapy with IV Vancomycin for treatment of suspected skin & soft tissue infection    Drug Allergies:   Review of patient's allergies indicates:  No Known Allergies    Actual Body Weight:   63.7 kg    Renal Function:   Estimated Creatinine Clearance: 49.9 mL/min (based on SCr of 0.8 mg/dL).,     Dialysis Method (if applicable):  N/A    CBC (last 72 hours):  Recent Labs   Lab Result Units 12/13/23  0643   WBC K/uL 5.69   Hemoglobin g/dL 12.1   Hematocrit % 37.2   Platelets K/uL 279   Gran % % 64.9   Lymph % % 20.9   Mono % % 9.8   Eosinophil % % 2.8   Basophil % % 1.4   Differential Method  Automated       Metabolic Panel (last 72 hours):  Recent Labs   Lab Result Units 12/13/23  0643   Sodium mmol/L 143   Potassium mmol/L 3.8   Chloride mmol/L 107   CO2 mmol/L 28   Glucose mg/dL 106   BUN mg/dL 22   Creatinine mg/dL 0.8   Albumin g/dL 3.0*   Total Bilirubin mg/dL 0.3   Alkaline Phosphatase U/L 98   AST U/L 17   ALT U/L 10       Drug levels (last 3 results):  No results for input(s): "VANCOMYCINRA", "VANCORANDOM", "VANCOMYCINPE", "VANCOPEAK", "VANCOMYCINTR", "VANCOTROUGH" in the last 72 hours.    Microbiologic Results:  Microbiology Results (last 7 days)       Procedure Component Value Units Date/Time    Blood culture [[id number]] Collected: 12/13/23 0733    Order " Status: Sent Specimen: Blood from Antecubital, Right Hand Updated: 12/13/23 0753    Blood culture [[id number]] Collected: 12/13/23 0733    Order Status: Sent Specimen: Blood from Hand Updated: 12/13/23 0753

## 2023-12-14 PROBLEM — B35.1 ONYCHOMYCOSIS OF TOENAIL: Status: ACTIVE | Noted: 2023-12-14

## 2023-12-14 LAB
1,3 BETA GLUCAN SER-MCNC: 34 PG/ML
FUNGITELL COMMENTS: NEGATIVE

## 2023-12-14 PROCEDURE — 25000003 PHARM REV CODE 250

## 2023-12-14 PROCEDURE — 25000003 PHARM REV CODE 250: Performed by: STUDENT IN AN ORGANIZED HEALTH CARE EDUCATION/TRAINING PROGRAM

## 2023-12-14 PROCEDURE — 87102 FUNGUS ISOLATION CULTURE: CPT | Performed by: STUDENT IN AN ORGANIZED HEALTH CARE EDUCATION/TRAINING PROGRAM

## 2023-12-14 PROCEDURE — 99231 SBSQ HOSP IP/OBS SF/LOW 25: CPT | Mod: ,,, | Performed by: PODIATRIST

## 2023-12-14 PROCEDURE — 63600175 PHARM REV CODE 636 W HCPCS

## 2023-12-14 PROCEDURE — 96366 THER/PROPH/DIAG IV INF ADDON: CPT

## 2023-12-14 PROCEDURE — 87070 CULTURE OTHR SPECIMN AEROBIC: CPT | Performed by: STUDENT IN AN ORGANIZED HEALTH CARE EDUCATION/TRAINING PROGRAM

## 2023-12-14 PROCEDURE — 63600175 PHARM REV CODE 636 W HCPCS: Performed by: STUDENT IN AN ORGANIZED HEALTH CARE EDUCATION/TRAINING PROGRAM

## 2023-12-14 PROCEDURE — 99204 PR OFFICE/OUTPT VISIT, NEW, LEVL IV, 45-59 MIN: ICD-10-PCS | Mod: ,,, | Performed by: STUDENT IN AN ORGANIZED HEALTH CARE EDUCATION/TRAINING PROGRAM

## 2023-12-14 PROCEDURE — 25000003 PHARM REV CODE 250: Performed by: HOSPITALIST

## 2023-12-14 PROCEDURE — G0378 HOSPITAL OBSERVATION PER HR: HCPCS

## 2023-12-14 PROCEDURE — 94761 N-INVAS EAR/PLS OXIMETRY MLT: CPT

## 2023-12-14 PROCEDURE — 96376 TX/PRO/DX INJ SAME DRUG ADON: CPT

## 2023-12-14 PROCEDURE — 99231 PR SUBSEQUENT HOSPITAL CARE,LEVL I: ICD-10-PCS | Mod: ,,, | Performed by: PODIATRIST

## 2023-12-14 PROCEDURE — 99204 OFFICE O/P NEW MOD 45 MIN: CPT | Mod: ,,, | Performed by: STUDENT IN AN ORGANIZED HEALTH CARE EDUCATION/TRAINING PROGRAM

## 2023-12-14 PROCEDURE — 96367 TX/PROPH/DG ADDL SEQ IV INF: CPT

## 2023-12-14 RX ORDER — AMLODIPINE BESYLATE 5 MG/1
5 TABLET ORAL DAILY
Status: DISCONTINUED | OUTPATIENT
Start: 2023-12-14 | End: 2023-12-17 | Stop reason: HOSPADM

## 2023-12-14 RX ORDER — TERBINAFINE HYDROCHLORIDE 250 MG/1
250 TABLET ORAL DAILY
Status: DISCONTINUED | OUTPATIENT
Start: 2023-12-14 | End: 2023-12-17 | Stop reason: HOSPADM

## 2023-12-14 RX ADMIN — CEFTRIAXONE SODIUM 2 G: 2 INJECTION, POWDER, FOR SOLUTION INTRAMUSCULAR; INTRAVENOUS at 08:12

## 2023-12-14 RX ADMIN — PIPERACILLIN AND TAZOBACTAM 4.5 G: 4; .5 INJECTION, POWDER, LYOPHILIZED, FOR SOLUTION INTRAVENOUS; PARENTERAL at 02:12

## 2023-12-14 RX ADMIN — AMLODIPINE BESYLATE 5 MG: 5 TABLET ORAL at 02:12

## 2023-12-14 RX ADMIN — LOSARTAN POTASSIUM 50 MG: 25 TABLET, FILM COATED ORAL at 08:12

## 2023-12-14 RX ADMIN — VANCOMYCIN HYDROCHLORIDE 1250 MG: 1.25 INJECTION, POWDER, LYOPHILIZED, FOR SOLUTION INTRAVENOUS at 10:12

## 2023-12-14 RX ADMIN — LOSARTAN POTASSIUM 50 MG: 25 TABLET, FILM COATED ORAL at 10:12

## 2023-12-14 RX ADMIN — TERBINAFINE TABLETS 250 MG 250 MG: 250 TABLET ORAL at 02:12

## 2023-12-14 RX ADMIN — PIPERACILLIN AND TAZOBACTAM 4.5 G: 4; .5 INJECTION, POWDER, LYOPHILIZED, FOR SOLUTION INTRAVENOUS; PARENTERAL at 12:12

## 2023-12-14 NOTE — PLAN OF CARE
Call placed to Dara pts relative listed in chart. No answer at this time, message left requesting call back. TN to follow for call back

## 2023-12-14 NOTE — NURSING
[facility]  Nurses Note -- 4 Eyes      12/14/2023       Skin assessed on: Q Shift      [x] No Pressure Injuries Present    [x]Prevention Measures Documented    [] Yes LDA  for Pressure Injury Previously documented     [] Yes New Pressure Injury Discovered   [] LDA for New Pressure Injury Added      Attending RN:  Lyric Wong RN     Second RN:  VISH Quintanilla         [FreeTextEntry1] : States vertigo symptoms persist despite switching from cephalexin to doxycycline on 11/23. Denies R knee pain. Continues with PT. Tolerating doxycycline. No fever or chills. Pending f/u with ortho.

## 2023-12-14 NOTE — SUBJECTIVE & OBJECTIVE
Past Medical History:   Diagnosis Date    Hypertension        Past Surgical History:   Procedure Laterality Date    EYE SURGERY         Review of patient's allergies indicates:  No Known Allergies    Medications:  No medications prior to admission.     Antibiotics (From admission, onward)      Start     Stop Route Frequency Ordered    12/14/23 1030  vancomycin 1,250 mg in dextrose 5 % (D5W) 250 mL IVPB (Vial-Mate)         -- IV Every 24 hours (non-standard times) 12/13/23 1032    12/13/23 0945  piperacillin-tazobactam (ZOSYN) 4.5 g in dextrose 5 % in water (D5W) 100 mL IVPB (MB+)         -- IV Every 8 hours (non-standard times) 12/13/23 0634    12/13/23 0705  vancomycin - pharmacy to dose  (vancomycin IVPB (PEDS and ADULTS))        See [facility] for full Linked Orders Report.    -- IV pharmacy to manage frequency 12/13/23 0634          Antifungals (From admission, onward)      Start     Stop Route Frequency Ordered    12/14/23 1430  terbinafine HCL tablet 250 mg         -- Oral Daily 12/14/23 1324          Antivirals (From admission, onward)      None               There is no immunization history on file for this patient.    Family History    None       Social History     Socioeconomic History    Marital status: Unknown   Tobacco Use    Smoking status: Never    Smokeless tobacco: Never   Substance and Sexual Activity    Alcohol use: Never    Drug use: Never     Review of Systems   Constitutional:  Negative for appetite change, chills, diaphoresis, fatigue and fever.   HENT: Negative.     Eyes:  Positive for visual disturbance.   Respiratory: Negative.     Cardiovascular: Negative.    Gastrointestinal: Negative.    Endocrine: Negative.    Genitourinary: Negative.    Musculoskeletal: Negative.    Skin:  Positive for color change and wound.   Allergic/Immunologic: Negative.    Neurological: Negative.    Hematological: Negative.    Psychiatric/Behavioral: Negative.       Objective:     Vital Signs (Most  Recent):  Temp: 98 °F (36.7 °C) (12/14/23 1123)  Pulse: 63 (12/14/23 1123)  Resp: 18 (12/14/23 1123)  BP: (!) 177/81 (12/14/23 1123)  SpO2: 96 % (12/14/23 1123) Vital Signs (24h Range):  Temp:  [97.1 °F (36.2 °C)-98 °F (36.7 °C)] 98 °F (36.7 °C)  Pulse:  [42-80] 63  Resp:  [17-18] 18  SpO2:  [95 %-98 %] 96 %  BP: (136-197)/(51-81) 177/81     Weight: 63.7 kg (140 lb 6.9 oz)  Body mass index is 27.43 kg/m².    Estimated Creatinine Clearance: 49.9 mL/min (based on SCr of 0.8 mg/dL).     Physical Exam  Vitals and nursing note reviewed.   Constitutional:       Appearance: Normal appearance.   HENT:      Head: Normocephalic.      Mouth/Throat:      Mouth: Mucous membranes are moist.      Pharynx: Oropharynx is clear.   Cardiovascular:      Rate and Rhythm: Normal rate.      Heart sounds: No murmur heard.  Pulmonary:      Effort: Pulmonary effort is normal. No respiratory distress.   Abdominal:      General: There is no distension.      Palpations: Abdomen is soft.      Tenderness: There is no abdominal tenderness.   Musculoskeletal:         General: Tenderness present.      Cervical back: Normal range of motion. No rigidity.   Lymphadenopathy:      Cervical: No cervical adenopathy.   Skin:     Findings: Erythema (bilateral lower extremities) present.      Comments: Bullae to right calf that is draining serous material   Neurological:      Mental Status: She is alert.   Psychiatric:         Mood and Affect: Mood normal.        Significant Labs:   Microbiology Results (last 7 days)       Procedure Component Value Units Date/Time    Aerobic culture [[id number]] Collected: 12/14/23 1605    Order Status: Sent Specimen: Wound from Toe, Right Foot Updated: 12/14/23 1617    Fungus culture [[id number]] Collected: 12/14/23 1605    Order Status: Sent Specimen: Wound from Toe, Right Foot Updated: 12/14/23 1617    Aerobic culture [[id number]] Collected: 12/13/23 1357    Order Status: Completed Specimen: Wound from Leg, Right Updated:  12/14/23 1154     Aerobic Bacterial Culture No growth    Blood culture [[id number]] Collected: 12/13/23 0733    Order Status: Completed Specimen: Blood from Antecubital, Right Hand Updated: 12/14/23 0903     Blood Culture, Routine No Growth to date      No Growth to date    Blood culture [[id number]] Collected: 12/13/23 0733    Order Status: Completed Specimen: Blood from Hand Updated: 12/14/23 0903     Blood Culture, Routine No Growth to date      No Growth to date    Gram stain [[id number]] Collected: 12/13/23 1357    Order Status: Completed Specimen: Wound from Leg, Right Updated: 12/13/23 1451     Gram Stain Result Rare WBC's      No organisms seen    Culture, Anaerobe [[id number]] Collected: 12/13/23 1357    Order Status: Sent Specimen: Wound from Leg, Right Updated: 12/13/23 1412            Significant Imaging: I have reviewed all pertinent imaging results/findings within the past 24 hours.

## 2023-12-14 NOTE — PLAN OF CARE
Problem: Adult Inpatient Plan of Care  Goal: Plan of Care Review  Outcome: Ongoing, Progressing  Goal: Patient-Specific Goal (Individualized)  Outcome: Ongoing, Progressing  Goal: Absence of Hospital-Acquired Illness or Injury  Outcome: Ongoing, Progressing  Goal: Optimal Comfort and Wellbeing  Outcome: Ongoing, Progressing  Intervention: Provide Person-Centered Care  Flowsheets (Taken 12/14/2023 7442)  Trust Relationship/Rapport:   care explained   choices provided   emotional support provided   empathic listening provided   questions answered   questions encouraged   reassurance provided   thoughts/feelings acknowledged  Goal: Readiness for Transition of Care  Outcome: Ongoing, Progressing     Problem: Impaired Wound Healing  Goal: Optimal Wound Healing  Outcome: Ongoing, Progressing     Problem: Skin Injury Risk Increased  Goal: Skin Health and Integrity  Outcome: Ongoing, Progressing     Problem: Fall Injury Risk  Goal: Absence of Fall and Fall-Related Injury  Outcome: Ongoing, Progressing

## 2023-12-14 NOTE — CONSULTS
Thank you for your consult to [facility]. We have reviewed the patient chart. This patient does meet criteria for [facility] service at this time.  Will assume care on 12/14/23 at 7AM.

## 2023-12-14 NOTE — NURSING
Bedside Report given to night nurse VISH Ocampo. Walking rounds completed. Visualized and assessed patient NAD noted. Safety precautions maintained and call light within reach.     Chart check completed.

## 2023-12-14 NOTE — PLAN OF CARE
12/14/23 1511   Discharge Planning   Assessment Type Discharge Planning Brief Assessment   Resource/Environmental Concerns none   Support Systems Children   Equipment Currently Used at Home none  (has a rolling walker and wheelchair)   Current Living Arrangements home   Patient/Family Anticipates Transition to home with family   Patient/Family Anticipated Services at Transition home health care   DME Needed Upon Discharge  other (see comments)  (TBD)   Discharge Plan A Home with family     Daughter Dara states that the pt will return to home with her and would like her to have home health/hospice.  States that the pt refuses to all daughter to see or cut her toe nails and her mother did not say anything until her legs look like they look now.  Daughter also states that the pt has shower chair, wheelchairs, rolling walker, and safety rails throughout the home to assist with ADL's along with family members

## 2023-12-14 NOTE — NURSING
Notified Lam Garay NP of asymptomatic hypertension with BP of 197/76. Scheduled losartan was given. Was ordered to recheck and give PRN per MAR. Recheck BP was 192/77 still asymptomatic. Gave PRN hydralazine per MAR and notified VALENTE Mendoza. Recheck BP was stable and WNL.

## 2023-12-14 NOTE — PLAN OF CARE
12/13/23@2105 LATEST  VS: 97.4-80-/76-95. Continue ivab zosyn and vancomycin. No change reported in patient condition remains in OBS,

## 2023-12-14 NOTE — PROGRESS NOTES
[facility] - Telemetry  Podiatry  Progress Note    Patient Name: Elena Xiao  MRN: [id number]  Admission Date: 12/13/2023  Hospital Length of Stay: 0 days  Attending Physician: Cheryl Espinosa MD  Primary Care Provider: Kady, Primary Doctor     Subjective:       History of Present Illness: 77 y/o female PMH HT, blindness admitted for BLE cellulitis. Reports noticing blister to right leg x several days.     Interval History: 12/14/2023 patient seen at bedside resting comfortably, she just finished her breakfast and was in a chair removed.  She relates that she continues to notice drainage coming from the right leg but states that they have been changing the padding regularly to avoid her feeling uncomfortable.  She states that the spot where she had the large blister to the right leg continues to be tender to touch.  No further complaints to the lower extremity.        Scheduled Meds:   losartan  50 mg Oral BID    piperacillin-tazobactam (Zosyn) IV (PEDS and ADULTS) (extended infusion is not appropriate)  4.5 g Intravenous Q8H    polyethylene glycol  17 g Oral Daily    vancomycin (VANCOCIN) IV (PEDS and ADULTS)  1,250 mg Intravenous Q24H     Continuous Infusions:  PRN Meds:acetaminophen, acetaminophen, dextrose 10%, dextrose 10%, glucagon (human recombinant), glucose, glucose, hydrALAZINE, melatonin, naloxone, ondansetron, prochlorperazine, sodium chloride 0.9%, Pharmacy to dose Vancomycin consult **AND** vancomycin - pharmacy to dose    Review of Systems   Constitutional: Negative.  Negative for activity change, appetite change, chills, fatigue and fever.   Eyes:  Positive for visual disturbance.   Respiratory: Negative.  Negative for cough.    Cardiovascular:  Negative for chest pain.   Gastrointestinal:  Negative for diarrhea, nausea and vomiting.   Genitourinary: Negative.    Musculoskeletal:  Positive for arthralgias and myalgias.   Skin:  Positive for color change and wound.   Neurological: Negative.         +  "paresthesia      Objective:     Vital Signs (Most Recent):  Temp: 98 °F (36.7 °C) (12/14/23 1123)  Pulse: 63 (12/14/23 1123)  Resp: 18 (12/14/23 1123)  BP: (!) 177/81 (12/14/23 1123)  SpO2: 96 % (12/14/23 1123) Vital Signs (24h Range):  Temp:  [97.1 °F (36.2 °C)-98.1 °F (36.7 °C)] 98 °F (36.7 °C)  Pulse:  [42-80] 63  Resp:  [17-18] 18  SpO2:  [95 %-100 %] 96 %  BP: (136-210)/(51-85) 177/81     Weight: 63.7 kg (140 lb 6.9 oz)  Body mass index is 27.43 kg/m².        Foot Exam    General  Orientation: alert and oriented to person, place, and time       Right Foot/Ankle     Inspection and Palpation  Skin Exam: skin changes;     Neurovascular  Dorsalis pedis: 1+  Posterior tibial: 1+  Saphenous nerve sensation: diminished  Tibial nerve sensation: diminished  Superficial peroneal nerve sensation: diminished  Deep peroneal nerve sensation: diminished  Sural nerve sensation: diminished      Left Foot/Ankle      Inspection and Palpation  Skin Exam: no ulcer     Neurovascular  Dorsalis pedis: 1+  Posterior tibial: 1+  Saphenous nerve sensation: diminished  Tibial nerve sensation: diminished  Superficial peroneal nerve sensation: diminished  Deep peroneal nerve sensation: diminished  Sural nerve sensation: diminished      12/14/2023 12/13/23:  Bullae formation right medial leg   Mycotic nails 1-5 B/L         Laboratory:  A1C: No results for input(s): "HGBA1C" in the last 4320 hours.  CBC:   Recent Labs   Lab 12/13/23  0643   WBC 5.69   RBC 4.11   HGB 12.1   HCT 37.2      MCV 91   MCH 29.4   MCHC 32.5     CMP:   Recent Labs   Lab 12/13/23  0643      CALCIUM 8.3*   ALBUMIN 3.0*   PROT 6.8      K 3.8   CO2 28      BUN 22   CREATININE 0.8   ALKPHOS 98   ALT 10   AST 17   BILITOT 0.3     CRP: No results for input(s): "CRP" in the last 168 hours.  ESR: No results for input(s): "SEDRATE" in the last 168 hours.  Microbiology Results (last 7 days)       Procedure Component Value Units Date/Time    " Aerobic culture [[id number]] Collected: 12/13/23 1357    Order Status: Completed Specimen: Wound from Leg, Right Updated: 12/14/23 1154     Aerobic Bacterial Culture No growth    Blood culture [[id number]] Collected: 12/13/23 0733    Order Status: Completed Specimen: Blood from Antecubital, Right Hand Updated: 12/14/23 0903     Blood Culture, Routine No Growth to date      No Growth to date    Blood culture [[id number]] Collected: 12/13/23 0733    Order Status: Completed Specimen: Blood from Hand Updated: 12/14/23 0903     Blood Culture, Routine No Growth to date      No Growth to date    Gram stain [[id number]] Collected: 12/13/23 1357    Order Status: Completed Specimen: Wound from Leg, Right Updated: 12/13/23 1451     Gram Stain Result Rare WBC's      No organisms seen    Culture, Anaerobe [[id number]] Collected: 12/13/23 1357    Order Status: Sent Specimen: Wound from Leg, Right Updated: 12/13/23 1412            Diagnostic Results:  Imaging Results              US Lower Extremity Veins Bilateral (Final result)  Result time 12/13/23 01:23:30      Final result by Qing Ozuna MD (12/13/23 01:23:30)                   Impression:      No evidence of lower extremity deep venous thrombosis.      Electronically signed by: Qing Ozuna MD  Date:    12/13/2023  Time:    01:23               Narrative:    EXAMINATION:  US LOWER EXTREMITY VEINS BILATERAL    CLINICAL HISTORY:  Cellulitis, unspecified    TECHNIQUE:  Duplex and color flow Doppler evaluation of the bilateral lower extremity veins was performed.    COMPARISON:  None    FINDINGS:  No evidence of clot involving the bilateral common femoral, greater saphenous, femoral, popliteal, peroneal, anterior and posterior tibial veins.  All venous structures demonstrate normal respiratory phasicity and augment adequately.  No evidence of soft tissue mass or Baker's cyst.                                      Assessment/Plan:     Active Diagnoses:    Diagnosis Date  Noted POA    PRINCIPAL PROBLEM:  Bilateral lower leg cellulitis [L03.116, L03.115] 12/13/2023 Yes    HTN (hypertension) [I10] 12/13/2023 Yes    Blindness, bilateral [H54.3] 12/13/2023 Yes    Asymptomatic bradycardia [R00.1] 12/13/2023 Yes    Debility [R53.81] 12/13/2023 Yes      Problems Resolved During this Admission:     Education about the prevention of limb loss.    Discussed wound healing cycle, skin integrity, ways to care for skin. Adequate vitamin supplementation, protein intake, and hydration - discussed with patient    The bullous site is cleansed of foreign material as much as possible.  Cellulitis appears to be improving.  Aquacel Ag and a Mepilex border applied.    No podiatric surgical intervention indicated.  We will defer to wound care nursing while inpatient and recommend patient be followed at home by at home wound care such as [facility] ([phone])    Podiatry will sign off.      Norma Vidal DPM  Podiat[place]  [facility] [place]

## 2023-12-14 NOTE — ASSESSMENT & PLAN NOTE
Elena Xiao is a 78 year old woman with hypertension and low vision who was admitted for bilateral leg erythema and blister to R calf. ID consulted for bilateral lower extremity cellulitis. Cellulitis appears non-purulent. Strep is a frequent cause of non-purulent cellulitis, though gram negatives could certainly cause it. Most likely mode was via significant onychomycosis and tinea pedis.   - okay to continue vancomycin goal trough 15-20 mcg/ml with dosing per pharmacy while admitted  - would stop pip-tazo  - start ceftriaxone 2 grams q24 hours  - at discharge, can convert to cefuroxime 500 mg BID to complete a 14 day course  - sent interdigital swabs for bacterial and fungal cultures

## 2023-12-14 NOTE — HPI
Elena Xiao is a 78 year old woman with hypertension and low vision who was admitted for bilateral leg erythema and blister to R calf. She lost her vision around 2019 and has not cut her toenails since. Her  and daughter were unable to cut her nails for her. Her leg is uncomfortable, but not painful. She has no fevers, chills, myalgias. She does not recall an injury prior to this. She was seen by vascular surgery and podiatry. Determined not to be of vascular etiology.

## 2023-12-15 LAB
ANION GAP SERPL CALC-SCNC: 8 MMOL/L (ref 8–16)
BASOPHILS # BLD AUTO: 0.07 K/UL (ref 0–0.2)
BASOPHILS NFR BLD: 1.1 % (ref 0–1.9)
BUN SERPL-MCNC: 18 MG/DL (ref 8–23)
CALCIUM SERPL-MCNC: 8.5 MG/DL (ref 8.7–10.5)
CHLORIDE SERPL-SCNC: 105 MMOL/L (ref 95–110)
CO2 SERPL-SCNC: 27 MMOL/L (ref 23–29)
CREAT SERPL-MCNC: 0.8 MG/DL (ref 0.5–1.4)
DIFFERENTIAL METHOD: ABNORMAL
EOSINOPHIL # BLD AUTO: 0.2 K/UL (ref 0–0.5)
EOSINOPHIL NFR BLD: 3.4 % (ref 0–8)
ERYTHROCYTE [DISTWIDTH] IN BLOOD BY AUTOMATED COUNT: 13.4 % (ref 11.5–14.5)
EST. GFR  (NO RACE VARIABLE): >60 ML/MIN/1.73 M^2
GLUCOSE SERPL-MCNC: 89 MG/DL (ref 70–110)
HCT VFR BLD AUTO: 37.8 % (ref 37–48.5)
HGB BLD-MCNC: 12.1 G/DL (ref 12–16)
IMM GRANULOCYTES # BLD AUTO: 0.02 K/UL (ref 0–0.04)
IMM GRANULOCYTES NFR BLD AUTO: 0.3 % (ref 0–0.5)
LYMPHOCYTES # BLD AUTO: 1 K/UL (ref 1–4.8)
LYMPHOCYTES NFR BLD: 15.6 % (ref 18–48)
MAGNESIUM SERPL-MCNC: 2.2 MG/DL (ref 1.6–2.6)
MCH RBC QN AUTO: 28.9 PG (ref 27–31)
MCHC RBC AUTO-ENTMCNC: 32 G/DL (ref 32–36)
MCV RBC AUTO: 90 FL (ref 82–98)
MONOCYTES # BLD AUTO: 0.5 K/UL (ref 0.3–1)
MONOCYTES NFR BLD: 8 % (ref 4–15)
NEUTROPHILS # BLD AUTO: 4.6 K/UL (ref 1.8–7.7)
NEUTROPHILS NFR BLD: 71.6 % (ref 38–73)
NRBC BLD-RTO: 0 /100 WBC
PHOSPHATE SERPL-MCNC: 2.8 MG/DL (ref 2.7–4.5)
PLATELET # BLD AUTO: 262 K/UL (ref 150–450)
PMV BLD AUTO: 10.2 FL (ref 9.2–12.9)
POTASSIUM SERPL-SCNC: 3.9 MMOL/L (ref 3.5–5.1)
RBC # BLD AUTO: 4.19 M/UL (ref 4–5.4)
SODIUM SERPL-SCNC: 140 MMOL/L (ref 136–145)
VANCOMYCIN TROUGH SERPL-MCNC: 12.2 UG/ML (ref 10–22)
WBC # BLD AUTO: 6.41 K/UL (ref 3.9–12.7)

## 2023-12-15 PROCEDURE — 63600175 PHARM REV CODE 636 W HCPCS: Performed by: STUDENT IN AN ORGANIZED HEALTH CARE EDUCATION/TRAINING PROGRAM

## 2023-12-15 PROCEDURE — 25000003 PHARM REV CODE 250: Performed by: HOSPITALIST

## 2023-12-15 PROCEDURE — 36415 COLL VENOUS BLD VENIPUNCTURE: CPT | Performed by: STUDENT IN AN ORGANIZED HEALTH CARE EDUCATION/TRAINING PROGRAM

## 2023-12-15 PROCEDURE — 25000003 PHARM REV CODE 250: Performed by: STUDENT IN AN ORGANIZED HEALTH CARE EDUCATION/TRAINING PROGRAM

## 2023-12-15 PROCEDURE — 36415 COLL VENOUS BLD VENIPUNCTURE: CPT | Performed by: HOSPITALIST

## 2023-12-15 PROCEDURE — 80048 BASIC METABOLIC PNL TOTAL CA: CPT | Performed by: HOSPITALIST

## 2023-12-15 PROCEDURE — 80202 ASSAY OF VANCOMYCIN: CPT | Performed by: STUDENT IN AN ORGANIZED HEALTH CARE EDUCATION/TRAINING PROGRAM

## 2023-12-15 PROCEDURE — 21400001 HC TELEMETRY ROOM

## 2023-12-15 PROCEDURE — 96366 THER/PROPH/DIAG IV INF ADDON: CPT

## 2023-12-15 PROCEDURE — 85025 COMPLETE CBC W/AUTO DIFF WBC: CPT | Performed by: HOSPITALIST

## 2023-12-15 PROCEDURE — 83735 ASSAY OF MAGNESIUM: CPT | Performed by: HOSPITALIST

## 2023-12-15 PROCEDURE — 84100 ASSAY OF PHOSPHORUS: CPT | Performed by: HOSPITALIST

## 2023-12-15 RX ADMIN — CEFTRIAXONE SODIUM 2 G: 2 INJECTION, POWDER, FOR SOLUTION INTRAMUSCULAR; INTRAVENOUS at 08:12

## 2023-12-15 RX ADMIN — TERBINAFINE TABLETS 250 MG 250 MG: 250 TABLET ORAL at 08:12

## 2023-12-15 RX ADMIN — AMLODIPINE BESYLATE 5 MG: 5 TABLET ORAL at 08:12

## 2023-12-15 RX ADMIN — LOSARTAN POTASSIUM 50 MG: 25 TABLET, FILM COATED ORAL at 08:12

## 2023-12-15 RX ADMIN — VANCOMYCIN HYDROCHLORIDE 1250 MG: 1.25 INJECTION, POWDER, LYOPHILIZED, FOR SOLUTION INTRAVENOUS at 10:12

## 2023-12-15 NOTE — PROGRESS NOTES
Pharmacokinetic Assessment Follow Up: IV Vancomycin    Vancomycin serum concentration assessment(s):    The trough level was drawn correctly and can be used to guide therapy at this time. The measurement is within the desired definitive target range of 10 to 20 mcg/mL.    Vancomycin Regimen Plan:    Continue regimen to Vancomycin 1250 mg IV every 24 hours with next serum trough concentration measured at 0930 prior to 3rd dose on 12/18    Drug levels (last 3 results):  Recent Labs   Lab Result Units 12/15/23  0924   Vancomycin-Trough ug/mL 12.2       Pharmacy will continue to follow and monitor vancomycin.    Please contact pharmacy at extension [phone] for questions regarding this assessment.    Thank you for the consult,   Jesus Tovar       Patient brief summary:  Elena Xiao is a 78 y.o. female initiated on antimicrobial therapy with IV Vancomycin for treatment of skin & soft tissue infection    The patient's current regimen is 1250 mg q24h    Drug Allergies:   Review of patient's allergies indicates:  No Known Allergies    Actual Body Weight:   63 kg    Renal Function:   Estimated Creatinine Clearance: 49.9 mL/min (based on SCr of 0.8 mg/dL).,     Dialysis Method (if applicable):  N/A    CBC (last 72 hours):  Recent Labs   Lab Result Units 12/13/23  0643 12/15/23  0408   WBC K/uL 5.69 6.41   Hemoglobin g/dL 12.1 12.1   Hematocrit % 37.2 37.8   Platelets K/uL 279 262   Gran % % 64.9 71.6   Lymph % % 20.9 15.6*   Mono % % 9.8 8.0   Eosinophil % % 2.8 3.4   Basophil % % 1.4 1.1   Differential Method  Automated Automated       Metabolic Panel (last 72 hours):  Recent Labs   Lab Result Units 12/13/23  0643 12/15/23  0408   Sodium mmol/L 143 140   Potassium mmol/L 3.8 3.9   Chloride mmol/L 107 105   CO2 mmol/L 28 27   Glucose mg/dL 106 89   BUN mg/dL 22 18   Creatinine mg/dL 0.8 0.8   Albumin g/dL 3.0*  --    Total Bilirubin mg/dL 0.3  --    Alkaline Phosphatase U/L 98  --    AST U/L 17  --    ALT U/L 10  --     Magnesium mg/dL  --  2.2   Phosphorus mg/dL  --  2.8       Vancomycin Administrations:  vancomycin given in the last 96 hours                     vancomycin 1,250 mg in dextrose 5 % (D5W) 250 mL IVPB (Vial-Mate) (mg) 1,250 mg New Bag 12/15/23 1051     1,250 mg New Bag 12/14/23 1018    vancomycin 1,250 mg in dextrose 5 % (D5W) 250 mL IVPB (Vial-Mate) (mg) 1,250 mg New Bag 12/13/23 1028                    Microbiologic Results:  Microbiology Results (last 7 days)       Procedure Component Value Units Date/Time    Aerobic culture [[id number]] Collected: 12/14/23 1605    Order Status: Completed Specimen: Wound from Toe, Right Foot Updated: 12/15/23 1017     Aerobic Bacterial Culture No growth    Blood culture [[id number]] Collected: 12/13/23 0733    Order Status: Completed Specimen: Blood from Hand Updated: 12/15/23 0903     Blood Culture, Routine No Growth to date      No Growth to date      No Growth to date    Blood culture [[id number]] Collected: 12/13/23 0733    Order Status: Completed Specimen: Blood from Antecubital, Right Hand Updated: 12/15/23 0903     Blood Culture, Routine No Growth to date      No Growth to date      No Growth to date    Culture, Anaerobe [[id number]] Collected: 12/13/23 1357    Order Status: Completed Specimen: Wound from Leg, Right Updated: 12/15/23 0754     Anaerobic Culture Culture in progress    Aerobic culture [[id number]] Collected: 12/13/23 1357    Order Status: Completed Specimen: Wound from Leg, Right Updated: 12/15/23 0741     Aerobic Bacterial Culture No growth    Fungus culture [[id number]] Collected: 12/14/23 1605    Order Status: Sent Specimen: Wound from Toe, Right Foot Updated: 12/14/23 1617    Gram stain [[id number]] Collected: 12/13/23 1357    Order Status: Completed Specimen: Wound from Leg, Right Updated: 12/13/23 1451     Gram Stain Result Rare WBC's      No organisms seen

## 2023-12-15 NOTE — SUBJECTIVE & OBJECTIVE
Interval History: Pt noted to be afebrile and hemodynamically stable. Stable respiratory status. No acute events overnight. No new complaints this morning. Still has erythema over BLE. ID consulted. Abx per ID. Start terbinafine.        Review of Systems   Constitutional:  Positive for activity change and fatigue. Negative for fever.   Respiratory:  Negative for shortness of breath.    Cardiovascular:  Negative for chest pain.   Gastrointestinal:  Negative for abdominal pain.   Skin:  Positive for color change.   Neurological:  Negative for dizziness and headaches.   All other systems reviewed and are negative.    Objective:     Vital Signs (Most Recent):  Temp: 97.6 °F (36.4 °C) (12/14/23 2334)  Pulse: 80 (12/14/23 2334)  Resp: 18 (12/14/23 2334)  BP: (!) 164/82 (12/14/23 2334)  SpO2: 97 % (12/14/23 2334) Vital Signs (24h Range):  Temp:  [97.1 °F (36.2 °C)-98 °F (36.7 °C)] 97.6 °F (36.4 °C)  Pulse:  [42-80] 80  Resp:  [17-18] 18  SpO2:  [96 %-98 %] 97 %  BP: (140-177)/(51-82) 164/82     Weight: 63.7 kg (140 lb 6.9 oz)  Body mass index is 27.43 kg/m².    Intake/Output Summary (Last 24 hours) at 12/14/2023 2356  Last data filed at 12/14/2023 1843  Gross per 24 hour   Intake --   Output 2700 ml   Net -2700 ml         Physical Exam  Vitals and nursing note reviewed.   Constitutional:       Appearance: Normal appearance.   HENT:      Head: Normocephalic and atraumatic.   Eyes:      Extraocular Movements: Extraocular movements intact.      Pupils: Pupils are equal, round, and reactive to light.   Cardiovascular:      Rate and Rhythm: Normal rate and regular rhythm.   Pulmonary:      Effort: Pulmonary effort is normal. No respiratory distress.   Abdominal:      General: There is no distension.   Musculoskeletal:         General: Normal range of motion.      Cervical back: Normal range of motion.      Comments: BLE erythema and edema    Neurological:      General: No focal deficit present.      Mental Status: She is alert  "and oriented to person, place, and time.   Psychiatric:         Mood and Affect: Mood normal.         Behavior: Behavior normal.             Significant Labs: All pertinent labs within the past 24 hours have been reviewed.  CBC:   Recent Labs   Lab 12/13/23  0643   WBC 5.69   HGB 12.1   HCT 37.2        CMP:   Recent Labs   Lab 12/13/23  0643      K 3.8      CO2 28      BUN 22   CREATININE 0.8   CALCIUM 8.3*   PROT 6.8   ALBUMIN 3.0*   BILITOT 0.3   ALKPHOS 98   AST 17   ALT 10   ANIONGAP 8     Cardiac Markers: No results for input(s): "CKMB", "MYOGLOBIN", "BNP", "TROPISTAT" in the last 48 hours.    Significant Imaging: I have reviewed all pertinent imaging results/findings within the past 24 hours.  "

## 2023-12-15 NOTE — NURSING
[facility] [place]  Nurses Note -- 4 Eyes      12/14/2023       Skin assessed on: Q Shift      [x] No Pressure Injuries Present    []Prevention Measures Documented    [] Yes LDA  for Pressure Injury Previously documented     [] Yes New Pressure Injury Discovered   [] LDA for New Pressure Injury Added      Attending RN:  Lyric Wong RN     Second RN:  VISH Kulkarni

## 2023-12-15 NOTE — ASSESSMENT & PLAN NOTE
Started on oral terbinafine. Caution with use, can cause a significant drug toxicity. Should have follow up labs with podiatry or primary care after discharge.

## 2023-12-15 NOTE — PROGRESS NOTES
"      [facility]  Telemedicine Progress Note    Patient Name: Elena Xiao  MRN: [id number]  Patient Class: OP- Observation   Admission Date: 12/13/2023  Length of Stay: 0 days  Attending Physician: Cheryl Espinosa MD  Primary Care Provider: Kady, Primary Doctor          Subjective:     Principal Problem:Bilateral lower leg cellulitis        HPI:  Ms. Xiao is a 78 yr old female with medical  hx of HTN who presented from the [facility] ED for c/o BLE swelling, erythema x 1 week, along with a fluid filled blister on her RLE x 2 days. She stated that today she noted burning of her BLE, which prompted her ED visit. 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 She has not cut her toenails since 2019 and has not seen her PCP since 2020. She states "I have never taken prescription medication my whole life." She endorses that she was previously prescribed HTN medication, but states she didn't take the medication because it made her dizzy. Along with her lower extremity erythema, and pain, she endorses rhinorrhea, but denies any headaches, chest pain, shortness of breath, sore throat, trouble swallowing, nausea, vomiting, abdominal pain, dysuria, hematuria and/or any other associated symptoms. She denies any known drug allergies.    In the ED:  Patient is afebrile without leukocytosis, hypertensive, CBC and CMP without abnormalities. Administered IV Zosyn x 1. Lower Extremity US noted to be negative for DVTs.    Elena Xiao was placed under observation for further management of lower extremity cellulitis and podiatric evaluation.    Overview/Hospital Course:  No notes on file    Interval History: Pt noted to be afebrile and hemodynamically stable. Stable respiratory status. No acute events overnight. No new complaints this morning. Still has erythema over BLE. ID consulted. Abx per ID. Start terbinafine.  "       Review of Systems   Constitutional:  Positive for activity change and fatigue. Negative for fever.   Respiratory:  Negative for shortness of breath.    Cardiovascular:  Negative for chest pain.   Gastrointestinal:  Negative for abdominal pain.   Skin:  Positive for color change.   Neurological:  Negative for dizziness and headaches.   All other systems reviewed and are negative.    Objective:     Vital Signs (Most Recent):  Temp: 97.6 °F (36.4 °C) (12/14/23 2334)  Pulse: 80 (12/14/23 2334)  Resp: 18 (12/14/23 2334)  BP: (!) 164/82 (12/14/23 2334)  SpO2: 97 % (12/14/23 2334) Vital Signs (24h Range):  Temp:  [97.1 °F (36.2 °C)-98 °F (36.7 °C)] 97.6 °F (36.4 °C)  Pulse:  [42-80] 80  Resp:  [17-18] 18  SpO2:  [96 %-98 %] 97 %  BP: (140-177)/(51-82) 164/82     Weight: 63.7 kg (140 lb 6.9 oz)  Body mass index is 27.43 kg/m².    Intake/Output Summary (Last 24 hours) at 12/14/2023 2356  Last data filed at 12/14/2023 1843  Gross per 24 hour   Intake --   Output 2700 ml   Net -2700 ml         Physical Exam  Vitals and nursing note reviewed.   Constitutional:       Appearance: Normal appearance.   HENT:      Head: Normocephalic and atraumatic.   Eyes:      Extraocular Movements: Extraocular movements intact.      Pupils: Pupils are equal, round, and reactive to light.   Cardiovascular:      Rate and Rhythm: Normal rate and regular rhythm.   Pulmonary:      Effort: Pulmonary effort is normal. No respiratory distress.   Abdominal:      General: There is no distension.   Musculoskeletal:         General: Normal range of motion.      Cervical back: Normal range of motion.      Comments: BLE erythema and edema    Neurological:      General: No focal deficit present.      Mental Status: She is alert and oriented to person, place, and time.   Psychiatric:         Mood and Affect: Mood normal.         Behavior: Behavior normal.             Significant Labs: All pertinent labs within the past 24 hours have been reviewed.  CBC:  "  Recent Labs   Lab 12/13/23  0643   WBC 5.69   HGB 12.1   HCT 37.2        CMP:   Recent Labs   Lab 12/13/23  0643      K 3.8      CO2 28      BUN 22   CREATININE 0.8   CALCIUM 8.3*   PROT 6.8   ALBUMIN 3.0*   BILITOT 0.3   ALKPHOS 98   AST 17   ALT 10   ANIONGAP 8     Cardiac Markers: No results for input(s): "CKMB", "MYOGLOBIN", "BNP", "TROPISTAT" in the last 48 hours.    Significant Imaging: I have reviewed all pertinent imaging results/findings within the past 24 hours.    Assessment/Plan:      * Bilateral lower leg cellulitis  Patient presented to the [facility] ED c/o of erythema and edema of her bilateral lower extremities x 1 week as well as a large blister on her right medial lower extremity x 2 days.  She stated that she noticed a burning sensation in her legs, which prompted her ED visit.   She states that she has not seen a doctor since 2020, and states that the last time she cut her toenails was back in 2019  Patient currently lives with her daughter who assists patient around the house.  Lower extremity US ordered which is negative for DVTs.  Blood cultures NGTD  Initially started on IV Vanc and Zosyn  Elevate lower extremities  Podiatry and Wound Care and ID consulted; appreciate recommendations.  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  SW consulted for family assessment and discharge planning.    Asymptomatic bradycardia  Patient noted to have HR: 45-47 upon admission to the " "floor, but not in any distress.  Echocardiogram ordered  Maintain on telemetry.    Blindness, bilateral  Noted on exam; patient states she is "partially blind in both eyes and can only see light."      HTN (hypertension)  Chronic, uncontrolled. Latest blood pressure and vitals reviewed-     Temp:  [97.1 °F (36.2 °C)-98 °F (36.7 °C)]   Pulse:  [42-80]   Resp:  [17-18]   BP: (140-177)/(51-82)   SpO2:  [96 %-98 %] .   Home meds for hypertension were reviewed and noted below.   Start amlodipine   Continue losartan      While in the hospital, will manage blood pressure as follows; Losartan added to regimen.    Will utilize p.r.n. blood pressure medication only if patient's blood pressure greater than 180/110 and she develops symptoms such as worsening chest pain or shortness of breath.    Patient states she was previously prescribed a medication for HTN, but never continued it due to it making her feel light headed.      VTE Risk Mitigation (From admission, onward)           Ordered     IP VTE HIGH RISK PATIENT  Once         12/13/23 0634     Place sequential compression device  Until discontinued         12/13/23 0634                          I have completed this tele-visit without the assistance of a telepresenter.    The attending portion of this evaluation, treatment, and documentation was performed per Cheryl Espinosa MD via Telemedicine AudioVisual using the secure [employer] software platform with 2 way audio/video. The provider was located off-site and the patient is located in the hospital. The aforementioned video software was utilized to document the relevant history and physical exam    Cheryl Espinosa MD  Department of Hospital Medicine   [facility] [place] - Telemetry    "

## 2023-12-15 NOTE — NURSING
[facility] [place]  Nurses Note -- 4 Eyes      12/15/2023       Skin assessed on: Q Shift      [x] No Pressure Injuries Present    [x]Prevention Measures Documented    [] Yes LDA  for Pressure Injury Previously documented     [] Yes New Pressure Injury Discovered   [] LDA for New Pressure Injury Added      Attending RN:  Mona Blair RN     Second RN:  VISH Gunter

## 2023-12-15 NOTE — CONSULTS
[facility]  Infectious Disease  Consult Note    Patient Name: Elena Xiao  MRN: [id number]  Admission Date: 12/13/2023  Hospital Length of Stay: 0 days  Attending Physician: Cheryl Espinosa MD  Primary Care Provider: No, Primary Doctor     Isolation Status: No active isolations    Patient information was obtained from patient and ER records.      Inpatient consult to Infectious Diseases  Consult performed by: Marli Ramos MD  Consult ordered by: Cheryl Espinosa MD        Assessment/Plan:     ID  * Bilateral lower leg cellulitis  Elena Xiao is a 78 year old woman with hypertension and low vision who was admitted for bilateral leg erythema and blister to R calf. ID consulted for bilateral lower extremity cellulitis. Cellulitis appears non-purulent. Strep is a frequent cause of non-purulent cellulitis, though gram negatives could certainly cause it. Most likely mode was via significant onychomycosis and tinea pedis.   - okay to continue vancomycin goal trough 15-20 mcg/ml with dosing per pharmacy while admitted  - would stop pip-tazo  - start ceftriaxone 2 grams q24 hours  - at discharge, can convert to cefuroxime 500 mg BID to complete a 14 day course  - sent interdigital swabs for bacterial and fungal cultures   - I will arrange outpatient ID follow up on 12/27/23       Onychomycosis of toenail  Started on oral terbinafine. Caution with use, can cause a significant drug toxicity. Should have follow up labs with podiatry or primary care after discharge.       Above discussed with primary team.     Time: 55 minutes   50% of time spent on face-to-face counseling and coordination of care. Counseling included review of test results, diagnosis, and treatment plan with patient and/or family.  I have reviewed hospital notes from  service and other specialty providers. I have also reviewed CBC, CMP/BMP,  cultures and imaging with my interpretation as documented.       Thank you for your consult. I  will sign off. Please contact us if you have any additional questions.    Marli Ramos MD  Infectious Disease  [facility] - Telemetry    Subjective:     Principal Problem: Bilateral lower leg cellulitis    HPI: Elena Xiao is a 78 year old woman with hypertension and low vision who was admitted for bilateral leg erythema and blister to R calf. She lost her vision around 2019 and has not cut her toenails since. Her  and daughter were unable to cut her nails for her. Her leg is uncomfortable, but not painful. She has no fevers, chills, myalgias. She does not recall an injury prior to this. She was seen by vascular surgery and podiatry. Determined not to be of vascular etiology.     Past Medical History:   Diagnosis Date    Hypertension        Past Surgical History:   Procedure Laterality Date    EYE SURGERY         Review of patient's allergies indicates:  No Known Allergies    Medications:  No medications prior to admission.     Antibiotics (From admission, onward)      Start     Stop Route Frequency Ordered    12/14/23 1030  vancomycin 1,250 mg in dextrose 5 % (D5W) 250 mL IVPB (Vial-Mate)         -- IV Every 24 hours (non-standard times) 12/13/23 1032    12/13/23 0945  piperacillin-tazobactam (ZOSYN) 4.5 g in dextrose 5 % in water (D5W) 100 mL IVPB (MB+)         -- IV Every 8 hours (non-standard times) 12/13/23 0634    12/13/23 0705  vancomycin - pharmacy to dose  (vancomycin IVPB (PEDS and ADULTS))        See Hyperspace for full Linked Orders Report.    -- IV pharmacy to manage frequency 12/13/23 0634          Antifungals (From admission, onward)      Start     Stop Route Frequency Ordered    12/14/23 1430  terbinafine HCL tablet 250 mg         -- Oral Daily 12/14/23 1324          Antivirals (From admission, onward)      None               There is no immunization history on file for this patient.    Family History    None       Social History     Socioeconomic History    Marital status: Unknown    Tobacco Use    Smoking status: Never    Smokeless tobacco: Never   Substance and Sexual Activity    Alcohol use: Never    Drug use: Never     Review of Systems   Constitutional:  Negative for appetite change, chills, diaphoresis, fatigue and fever.   HENT: Negative.     Eyes:  Positive for visual disturbance.   Respiratory: Negative.     Cardiovascular: Negative.    Gastrointestinal: Negative.    Endocrine: Negative.    Genitourinary: Negative.    Musculoskeletal: Negative.    Skin:  Positive for color change and wound.   Allergic/Immunologic: Negative.    Neurological: Negative.    Hematological: Negative.    Psychiatric/Behavioral: Negative.       Objective:     Vital Signs (Most Recent):  Temp: 98 °F (36.7 °C) (12/14/23 1123)  Pulse: 63 (12/14/23 1123)  Resp: 18 (12/14/23 1123)  BP: (!) 177/81 (12/14/23 1123)  SpO2: 96 % (12/14/23 1123) Vital Signs (24h Range):  Temp:  [97.1 °F (36.2 °C)-98 °F (36.7 °C)] 98 °F (36.7 °C)  Pulse:  [42-80] 63  Resp:  [17-18] 18  SpO2:  [95 %-98 %] 96 %  BP: (136-197)/(51-81) 177/81     Weight: 63.7 kg (140 lb 6.9 oz)  Body mass index is 27.43 kg/m².    Estimated Creatinine Clearance: 49.9 mL/min (based on SCr of 0.8 mg/dL).     Physical Exam  Vitals and nursing note reviewed.   Constitutional:       Appearance: Normal appearance.   HENT:      Head: Normocephalic.      Mouth/Throat:      Mouth: Mucous membranes are moist.      Pharynx: Oropharynx is clear.   Cardiovascular:      Rate and Rhythm: Normal rate.      Heart sounds: No murmur heard.  Pulmonary:      Effort: Pulmonary effort is normal. No respiratory distress.   Abdominal:      General: There is no distension.      Palpations: Abdomen is soft.      Tenderness: There is no abdominal tenderness.   Musculoskeletal:         General: Tenderness present.      Cervical back: Normal range of motion. No rigidity.   Lymphadenopathy:      Cervical: No cervical adenopathy.   Skin:     Findings: Erythema (bilateral lower extremities)  present.      Comments: Bullae to right calf that is draining serous material   Neurological:      Mental Status: She is alert.   Psychiatric:         Mood and Affect: Mood normal.        Significant Labs:   Microbiology Results (last 7 days)       Procedure Component Value Units Date/Time    Aerobic culture [[id number]] Collected: 12/14/23 1605    Order Status: Sent Specimen: Wound from Toe, Right Foot Updated: 12/14/23 1617    Fungus culture [[id number]] Collected: 12/14/23 1605    Order Status: Sent Specimen: Wound from Toe, Right Foot Updated: 12/14/23 1617    Aerobic culture [[id number]] Collected: 12/13/23 1357    Order Status: Completed Specimen: Wound from Leg, Right Updated: 12/14/23 1154     Aerobic Bacterial Culture No growth    Blood culture [[id number]] Collected: 12/13/23 0733    Order Status: Completed Specimen: Blood from Antecubital, Right Hand Updated: 12/14/23 0903     Blood Culture, Routine No Growth to date      No Growth to date    Blood culture [[id number]] Collected: 12/13/23 0733    Order Status: Completed Specimen: Blood from Hand Updated: 12/14/23 0903     Blood Culture, Routine No Growth to date      No Growth to date    Gram stain [[id number]] Collected: 12/13/23 1357    Order Status: Completed Specimen: Wound from Leg, Right Updated: 12/13/23 1451     Gram Stain Result Rare WBC's      No organisms seen    Culture, Anaerobe [[id number]] Collected: 12/13/23 1357    Order Status: Sent Specimen: Wound from Leg, Right Updated: 12/13/23 1412            Significant Imaging: I have reviewed all pertinent imaging results/findings within the past 24 hours.

## 2023-12-15 NOTE — ASSESSMENT & PLAN NOTE
Patient presented to the [facility] ED c/o of erythema and edema of her bilateral lower extremities x 1 week as well as a large blister on her right medial lower extremity x 2 days.  She stated that she noticed a burning sensation in her legs, which prompted her ED visit.   She states that she has not seen a doctor since 2020, and states that the last time she cut her toenails was back in 2019  Patient currently lives with her daughter who assists patient around the house.  Lower extremity US ordered which is negative for DVTs.  Blood cultures NGTD  Initially started on IV Vanc and Zosyn  Elevate lower extremities  Podiatry and Wound Care and ID consulted; appreciate recommendations.  Per ID: - okay to continue vancomycin goal trough 15-20 mcg/ml with dosing per pharmacy while admitted  - stop pip-tazo  - start ceftriaxone 2 grams q24 hours  - at discharge, can convert to cefuroxime 500 mg BID to complete a 14 day course  - sent interdigital swabs for bacterial and fungal cultures   - arrange outpatient ID follow up on 12/27/23

## 2023-12-15 NOTE — NURSING
PER handoff received from VISH Gunter     Pt resting in bed quietly. NAD noted. No c/o pain.     Fall and safety precautions maintained. Bed alarm activated and audible.. Bed locked in lowest position, with side rails up x2. Call bell and personal items within reach

## 2023-12-15 NOTE — CONSULTS
[facility]  [facility]    Patient Name:  Elena Xiao   MRN:  [id number]  Date: 12/14/2023  Diagnosis: Bilateral lower leg cellulitis    History:     Past Medical History:   Diagnosis Date    Hypertension        Social History     Socioeconomic History    Marital status: Unknown   Tobacco Use    Smoking status: Never    Smokeless tobacco: Never   Substance and Sexual Activity    Alcohol use: Never    Drug use: Never       Precautions:     Allergies as of 12/12/2023    (Not on File)       WO Assessment Details/Treatment     Active Problem List with Overview Notes    Diagnosis Date Noted    Onychomycosis of toenail 12/14/2023    Bilateral lower leg cellulitis 12/13/2023    HTN (hypertension) 12/13/2023    Blindness, bilateral 12/13/2023    Asymptomatic bradycardia 12/13/2023    Debility 12/13/2023   Assessment/Plan:  Podiatry deferred to wound care nursing while inpatient.  Currently right leg wound dressed with Aquacel and Mepilex dressing. Aquacel containing serous drainage from bullous site.   Noted less erythema to bilateral lower legs.   Nursing to change dressing every 2 days and prn.  Recommendations made to primary team. Orders placed.     12/14/2023

## 2023-12-15 NOTE — ASSESSMENT & PLAN NOTE
Chronic, uncontrolled. Latest blood pressure and vitals reviewed-     Temp:  [97.1 °F (36.2 °C)-98 °F (36.7 °C)]   Pulse:  [42-80]   Resp:  [17-18]   BP: (140-177)/(51-82)   SpO2:  [96 %-98 %] .   Home meds for hypertension were reviewed and noted below.   Start amlodipine   Continue losartan      While in the hospital, will manage blood pressure as follows; Losartan added to regimen.    Will utilize p.r.n. blood pressure medication only if patient's blood pressure greater than 180/110 and she develops symptoms such as worsening chest pain or shortness of breath.    Patient states she was previously prescribed a medication for HTN, but never continued it due to it making her feel light headed.

## 2023-12-15 NOTE — PLAN OF CARE
12/14/23@2129 LATEST  VS: 97.7-77-/76-97. Per ID: okay to continue vancomycin goal trough 15-20 mcg/ml with dosing per pharmacy while admitted - would stop pip-tazo  - start ceftriaxone 2 grams q24 hours- at discharge, can convert to cefuroxime 500 mg BID to complete a 14 day course- sent interdigital swabs for bacterial and fungal cultures   - I will arrange outpatient ID follow up on 12/27/23 . Both ID and Podiatry signed off case. No change reported in patient condition remains in OBS.

## 2023-12-16 PROCEDURE — 25000003 PHARM REV CODE 250: Performed by: HOSPITALIST

## 2023-12-16 PROCEDURE — 63600175 PHARM REV CODE 636 W HCPCS: Performed by: STUDENT IN AN ORGANIZED HEALTH CARE EDUCATION/TRAINING PROGRAM

## 2023-12-16 PROCEDURE — 25000003 PHARM REV CODE 250: Performed by: STUDENT IN AN ORGANIZED HEALTH CARE EDUCATION/TRAINING PROGRAM

## 2023-12-16 PROCEDURE — 25000003 PHARM REV CODE 250

## 2023-12-16 PROCEDURE — 63600175 PHARM REV CODE 636 W HCPCS

## 2023-12-16 PROCEDURE — 21400001 HC TELEMETRY ROOM

## 2023-12-16 RX ADMIN — AMLODIPINE BESYLATE 5 MG: 5 TABLET ORAL at 09:12

## 2023-12-16 RX ADMIN — TERBINAFINE TABLETS 250 MG 250 MG: 250 TABLET ORAL at 09:12

## 2023-12-16 RX ADMIN — Medication 6 MG: at 08:12

## 2023-12-16 RX ADMIN — VANCOMYCIN HYDROCHLORIDE 1250 MG: 1.25 INJECTION, POWDER, LYOPHILIZED, FOR SOLUTION INTRAVENOUS at 11:12

## 2023-12-16 RX ADMIN — CEFTRIAXONE SODIUM 2 G: 2 INJECTION, POWDER, FOR SOLUTION INTRAMUSCULAR; INTRAVENOUS at 08:12

## 2023-12-16 RX ADMIN — LOSARTAN POTASSIUM 50 MG: 25 TABLET, FILM COATED ORAL at 08:12

## 2023-12-16 RX ADMIN — HYDRALAZINE HYDROCHLORIDE 10 MG: 20 INJECTION, SOLUTION INTRAMUSCULAR; INTRAVENOUS at 12:12

## 2023-12-16 RX ADMIN — LOSARTAN POTASSIUM 50 MG: 25 TABLET, FILM COATED ORAL at 09:12

## 2023-12-16 NOTE — NURSING
Nurses Note -- 4 Eyes      12/15/2023   7:45 PM      Skin assessed during: Q Shift Change      [x] No Pressure Injuries  Present    [x]Prevention Measures Documented      [] Yes- Altered Skin Integrity Present or Discovered   [] LDA Added if Not in Epic (Describe Wound)   [] New Altered Skin Integrity was Present on Admit and Documented in LDA   [] Wound Image Taken        Attending Nurse:  Gracy Godoy RN/Staff Member:  Mona

## 2023-12-16 NOTE — ASSESSMENT & PLAN NOTE
Patient presented to the [facility] ED c/o of erythema and edema of her bilateral lower extremities x 1 week as well as a large blister on her right medial lower extremity x 2 days.  She stated that she noticed a burning sensation in her legs, which prompted her ED visit.   She states that she has not seen a doctor since 2020, and states that the last time she cut her toenails was back in 2019  Patient currently lives with her daughter who assists patient around the house.  Lower extremity US ordered which is negative for DVTs.  Blood cultures NGTD  Initially started on IV Vanc and Zosyn  Elevate lower extremities  Podiatry and Wound Care and ID consulted; appreciate recommendations.  Per ID: - okay to continue vancomycin goal trough 15-20 mcg/ml with dosing per pharmacy while admitted  - stop pip-tazo  - start ceftriaxone 2 grams q24 hours  - at discharge, can convert to cefuroxime 500 mg BID to complete a 14 day course  - sent interdigital swabs for bacterial and fungal cultures   - arrange outpatient ID follow up on 12/27/23    -pt still without significant improvement. Continue antibiotics

## 2023-12-16 NOTE — NURSING
[facility]  Nurses Note -- 4 Eyes      12/16/2023       Skin assessed on: Q Shift      [x] No Pressure Injuries Present    [x]Prevention Measures Documented    [] Yes LDA  for Pressure Injury Previously documented     [] Yes New Pressure Injury Discovered   [] LDA for New Pressure Injury Added      Attending RN:  Socorro Sanderson LPN     Second RN:  Gracy DavisRN

## 2023-12-16 NOTE — SUBJECTIVE & OBJECTIVE
Interval History: Pt noted to be afebrile and hemodynamically stable. Stable respiratory status. No acute events overnight. Pt still has erythema over BLE. No significant improvement today. Continue IV abx and terbinafine. Appreciate ID input.       Review of Systems   Constitutional:  Positive for activity change and fatigue. Negative for fever.   Respiratory:  Negative for shortness of breath.    Cardiovascular:  Negative for chest pain.   Gastrointestinal:  Negative for abdominal pain.   Skin:  Positive for color change.   Neurological:  Negative for dizziness and headaches.   All other systems reviewed and are negative.    Objective:     Vital Signs (Most Recent):  Temp: 98.2 °F (36.8 °C) (12/16/23 0040)  Pulse: 77 (12/16/23 0201)  Resp: 20 (12/16/23 0040)  BP: (!) 142/63 (post hydralazine) (12/16/23 0201)  SpO2: 99 % (12/16/23 0040) Vital Signs (24h Range):  Temp:  [97.2 °F (36.2 °C)-98.5 °F (36.9 °C)] 98.2 °F (36.8 °C)  Pulse:  [60-77] 77  Resp:  [18-20] 20  SpO2:  [95 %-99 %] 99 %  BP: (137-197)/(63-89) 142/63     Weight: 63.7 kg (140 lb 6.9 oz)  Body mass index is 27.43 kg/m².    Intake/Output Summary (Last 24 hours) at 12/16/2023 0205  Last data filed at 12/15/2023 1925  Gross per 24 hour   Intake 720 ml   Output 2300 ml   Net -1580 ml           Physical Exam  Vitals and nursing note reviewed.   Constitutional:       Appearance: Normal appearance.   HENT:      Head: Normocephalic and atraumatic.   Eyes:      Extraocular Movements: Extraocular movements intact.      Pupils: Pupils are equal, round, and reactive to light.   Cardiovascular:      Rate and Rhythm: Normal rate and regular rhythm.   Pulmonary:      Effort: Pulmonary effort is normal. No respiratory distress.   Abdominal:      General: There is no distension.   Musculoskeletal:         General: Normal range of motion.      Cervical back: Normal range of motion.      Comments: BLE erythema and edema    Neurological:      General: No focal deficit  "present.      Mental Status: She is alert and oriented to person, place, and time.   Psychiatric:         Mood and Affect: Mood normal.         Behavior: Behavior normal.             Significant Labs: All pertinent labs within the past 24 hours have been reviewed.  CBC:   Recent Labs   Lab 12/15/23  0408   WBC 6.41   HGB 12.1   HCT 37.8          CMP:   Recent Labs   Lab 12/15/23  0408      K 3.9      CO2 27   GLU 89   BUN 18   CREATININE 0.8   CALCIUM 8.5*   ANIONGAP 8       Cardiac Markers: No results for input(s): "CKMB", "MYOGLOBIN", "BNP", "TROPISTAT" in the last 48 hours.    Significant Imaging: I have reviewed all pertinent imaging results/findings within the past 24 hours.  "

## 2023-12-16 NOTE — PROGRESS NOTES
"      [facility] Medicine  Telemedicine Progress Note    Patient Name: Elena Xiao  MRN: [id number]  Patient Class: IP- Inpatient   Admission Date: 12/13/2023  Length of Stay: 1 days  Attending Physician: Cheryl Espinosa MD  Primary Care Provider: Kady, Primary Doctor          Subjective:     Principal Problem:Bilateral lower leg cellulitis        HPI:  Ms. Xiao is a 78 yr old female with medical  hx of HTN who presented from the [facility] ED for c/o BLE swelling, erythema x 1 week, along with a fluid filled blister on her RLE x 2 days. She stated that today she noted burning of her BLE, which prompted her ED visit. 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 She has not cut her toenails since 2019 and has not seen her PCP since 2020. She states "I have never taken prescription medication my whole life." She endorses that she was previously prescribed HTN medication, but states she didn't take the medication because it made her dizzy. Along with her lower extremity erythema, and pain, she endorses rhinorrhea, but denies any headaches, chest pain, shortness of breath, sore throat, trouble swallowing, nausea, vomiting, abdominal pain, dysuria, hematuria and/or any other associated symptoms. She denies any known drug allergies.    In the ED:  Patient is afebrile without leukocytosis, hypertensive, CBC and CMP without abnormalities. Administered IV Zosyn x 1. Lower Extremity US noted to be negative for DVTs.    Elena Xiao was placed under observation for further management of lower extremity cellulitis and podiatric evaluation.    Overview/Hospital Course:  No notes on file    Interval History: Pt noted to be afebrile and hemodynamically stable. Stable respiratory status. No acute events overnight. Pt still has erythema over BLE. No significant improvement today. Continue IV abx and terbinafine. Appreciate ID " input.       Review of Systems   Constitutional:  Positive for activity change and fatigue. Negative for fever.   Respiratory:  Negative for shortness of breath.    Cardiovascular:  Negative for chest pain.   Gastrointestinal:  Negative for abdominal pain.   Skin:  Positive for color change.   Neurological:  Negative for dizziness and headaches.   All other systems reviewed and are negative.    Objective:     Vital Signs (Most Recent):  Temp: 98.2 °F (36.8 °C) (12/16/23 0040)  Pulse: 77 (12/16/23 0201)  Resp: 20 (12/16/23 0040)  BP: (!) 142/63 (post hydralazine) (12/16/23 0201)  SpO2: 99 % (12/16/23 0040) Vital Signs (24h Range):  Temp:  [97.2 °F (36.2 °C)-98.5 °F (36.9 °C)] 98.2 °F (36.8 °C)  Pulse:  [60-77] 77  Resp:  [18-20] 20  SpO2:  [95 %-99 %] 99 %  BP: (137-197)/(63-89) 142/63     Weight: 63.7 kg (140 lb 6.9 oz)  Body mass index is 27.43 kg/m².    Intake/Output Summary (Last 24 hours) at 12/16/2023 0205  Last data filed at 12/15/2023 1925  Gross per 24 hour   Intake 720 ml   Output 2300 ml   Net -1580 ml           Physical Exam  Vitals and nursing note reviewed.   Constitutional:       Appearance: Normal appearance.   HENT:      Head: Normocephalic and atraumatic.   Eyes:      Extraocular Movements: Extraocular movements intact.      Pupils: Pupils are equal, round, and reactive to light.   Cardiovascular:      Rate and Rhythm: Normal rate and regular rhythm.   Pulmonary:      Effort: Pulmonary effort is normal. No respiratory distress.   Abdominal:      General: There is no distension.   Musculoskeletal:         General: Normal range of motion.      Cervical back: Normal range of motion.      Comments: BLE erythema and edema    Neurological:      General: No focal deficit present.      Mental Status: She is alert and oriented to person, place, and time.   Psychiatric:         Mood and Affect: Mood normal.         Behavior: Behavior normal.             Significant Labs: All pertinent labs within the past 24  "hours have been reviewed.  CBC:   Recent Labs   Lab 12/15/23  0408   WBC 6.41   HGB 12.1   HCT 37.8          CMP:   Recent Labs   Lab 12/15/23  0408      K 3.9      CO2 27   GLU 89   BUN 18   CREATININE 0.8   CALCIUM 8.5*   ANIONGAP 8       Cardiac Markers: No results for input(s): "CKMB", "MYOGLOBIN", "BNP", "TROPISTAT" in the last 48 hours.    Significant Imaging: I have reviewed all pertinent imaging results/findings within the past 24 hours.    Assessment/Plan:      * Bilateral lower leg cellulitis  Patient presented to the [facility] ED c/o of erythema and edema of her bilateral lower extremities x 1 week as well as a large blister on her right medial lower extremity x 2 days.  She stated that she noticed a burning sensation in her legs, which prompted her ED visit.   She states that she has not seen a doctor since 2020, and states that the last time she cut her toenails was back in 2019  Patient currently lives with her daughter who assists patient around the house.  Lower extremity US ordered which is negative for DVTs.  Blood cultures NGTD  Initially started on IV Vanc and Zosyn  Elevate lower extremities  Podiatry and Wound Care and ID consulted; appreciate recommendations.  Per ID: - okay to continue vancomycin goal trough 15-20 mcg/ml with dosing per pharmacy while admitted  - stop pip-tazo  - start ceftriaxone 2 grams q24 hours  - at discharge, can convert to cefuroxime 500 mg BID to complete a 14 day course  - sent interdigital swabs for bacterial and fungal cultures   - arrange outpatient ID follow up on 12/27/23    -pt still without significant improvement. 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  SW consulted for family assessment and discharge planning.    Asymptomatic bradycardia  Patient noted to have " "HR: 45-47 upon admission to the floor, but not in any distress.  Echocardiogram ordered  Maintain on telemetry.    Blindness, bilateral  Noted on exam; patient states she is "partially blind in both eyes and can only see light."      HTN (hypertension)  Chronic, uncontrolled. Latest blood pressure and vitals reviewed-     Temp:  [97.1 °F (36.2 °C)-98 °F (36.7 °C)]   Pulse:  [42-80]   Resp:  [17-18]   BP: (140-177)/(51-82)   SpO2:  [96 %-98 %] .   Home meds for hypertension were reviewed and noted below.   Start amlodipine   Continue losartan      While in the hospital, will manage blood pressure as follows; Losartan added to regimen.    Will utilize p.r.n. blood pressure medication only if patient's blood pressure greater than 180/110 and she develops symptoms such as worsening chest pain or shortness of breath.    Patient states she was previously prescribed a medication for HTN, but never continued it due to it making her feel light headed.      VTE Risk Mitigation (From admission, onward)           Ordered     IP VTE HIGH RISK PATIENT  Once         12/13/23 0634     Place sequential compression device  Until discontinued         12/13/23 0634                          I have completed this tele-visit without the assistance of a telepresenter.    The attending portion of this evaluation, treatment, and documentation was performed per Cheryl Espinosa MD via Telemedicine AudioVisual using the secure [employer] software platform with 2 way audio/video. The provider was located off-site and the patient is located in the hospital. The aforementioned video software was utilized to document the relevant history and physical exam    Cheryl Espinosa MD  Department of Hospital Medicine   [facility] - Telemetry    "

## 2023-12-16 NOTE — PLAN OF CARE
Pt remained free of falls during current shift. Pt turned q2h.  Pt appeared to be in no distress and did not receive any prn pain medications. IV antibiotic was given per MD order. Plan of care and fall precautions reviewed with pt and verbalized understanding. Bed locked, lowered, SR up x2 and call light placed within reach.          Problem: Adult Inpatient Plan of Care  Goal: Plan of Care Review  Outcome: Ongoing, Progressing     Problem: Impaired Wound Healing  Goal: Optimal Wound Healing  Outcome: Ongoing, Progressing     Problem: Skin Injury Risk Increased  Goal: Skin Health and Integrity  Outcome: Ongoing, Progressing     Problem: Fall Injury Risk  Goal: Absence of Fall and Fall-Related Injury  Outcome: Ongoing, Progressing

## 2023-12-16 NOTE — PLAN OF CARE
Problem: Adult Inpatient Plan of Care  Goal: Plan of Care Review  Flowsheets (Taken 12/16/2023 0622)  Plan of Care Reviewed With:   patient   family  Goal: Absence of Hospital-Acquired Illness or Injury  Intervention: Identify and Manage Fall Risk  Flowsheets (Taken 12/16/2023 0622)  Safety Promotion/Fall Prevention:   assistive device/personal item within reach   bed alarm set   diversional activities provided   Fall Risk reviewed with patient/family   medications reviewed   nonskid shoes/socks when out of bed   side rails raised x 3   instructed to call staff for mobility  Intervention: Prevent Skin Injury  Flowsheets (Taken 12/16/2023 0622)  Body Position: position changed independently  Intervention: Prevent and Manage VTE (Venous Thromboembolism) Risk  Flowsheets (Taken 12/16/2023 0622)  Activity Management:   Rolling - L1   Arm raise - L1  Intervention: Prevent Infection  Flowsheets (Taken 12/16/2023 0622)  Infection Prevention:   environmental surveillance performed   hand hygiene promoted   single patient room provided  Goal: Optimal Comfort and Wellbeing  Intervention: Monitor Pain and Promote Comfort  Flowsheets (Taken 12/16/2023 0622)  Pain Management Interventions: care clustered  Intervention: Provide Person-Centered Care  Flowsheets (Taken 12/16/2023 0622)  Trust Relationship/Rapport: care explained     Problem: Impaired Wound Healing  Goal: Optimal Wound Healing  Intervention: Promote Wound Healing  Flowsheets (Taken 12/16/2023 0622)  Activity Management:   Rolling - L1   Arm raise - L1  Pain Management Interventions: care clustered     Problem: Skin Injury Risk Increased  Goal: Skin Health and Integrity  Intervention: Optimize Skin Protection  Flowsheets (Taken 12/16/2023 0622)  Pressure Reduction Techniques: frequent weight shift encouraged  Head of Bed (HOB) Positioning: HOB elevated

## 2023-12-17 VITALS
DIASTOLIC BLOOD PRESSURE: 77 MMHG | BODY MASS INDEX: 27.57 KG/M2 | OXYGEN SATURATION: 98 % | WEIGHT: 140.44 LBS | TEMPERATURE: 98 F | HEART RATE: 66 BPM | RESPIRATION RATE: 19 BRPM | HEIGHT: 60 IN | SYSTOLIC BLOOD PRESSURE: 172 MMHG

## 2023-12-17 LAB
BACTERIA BLD CULT: NORMAL
BACTERIA BLD CULT: NORMAL
BACTERIA SPEC AEROBE CULT: NO GROWTH
BACTERIA SPEC AEROBE CULT: NO GROWTH
BACTERIA SPEC ANAEROBE CULT: NORMAL
CREAT SERPL-MCNC: 0.8 MG/DL (ref 0.5–1.4)
EST. GFR  (NO RACE VARIABLE): >60 ML/MIN/1.73 M^2

## 2023-12-17 PROCEDURE — 36415 COLL VENOUS BLD VENIPUNCTURE: CPT | Performed by: HOSPITALIST

## 2023-12-17 PROCEDURE — 82565 ASSAY OF CREATININE: CPT | Performed by: HOSPITALIST

## 2023-12-17 PROCEDURE — 25000003 PHARM REV CODE 250: Performed by: STUDENT IN AN ORGANIZED HEALTH CARE EDUCATION/TRAINING PROGRAM

## 2023-12-17 PROCEDURE — 25000003 PHARM REV CODE 250: Performed by: HOSPITALIST

## 2023-12-17 PROCEDURE — 63600175 PHARM REV CODE 636 W HCPCS: Performed by: STUDENT IN AN ORGANIZED HEALTH CARE EDUCATION/TRAINING PROGRAM

## 2023-12-17 RX ORDER — CEFUROXIME AXETIL 500 MG/1
500 TABLET ORAL 2 TIMES DAILY
Qty: 28 TABLET | Refills: 0 | Status: SHIPPED | OUTPATIENT
Start: 2023-12-17 | End: 2023-12-31

## 2023-12-17 RX ORDER — TERBINAFINE HYDROCHLORIDE 250 MG/1
250 TABLET ORAL DAILY
Qty: 30 TABLET | Refills: 0 | Status: SHIPPED | OUTPATIENT
Start: 2023-12-17 | End: 2024-01-16

## 2023-12-17 RX ORDER — LOSARTAN POTASSIUM 50 MG/1
50 TABLET ORAL 2 TIMES DAILY
Qty: 180 TABLET | Refills: 3 | Status: SHIPPED | OUTPATIENT
Start: 2023-12-17 | End: 2024-12-16

## 2023-12-17 RX ORDER — AMLODIPINE BESYLATE 5 MG/1
5 TABLET ORAL DAILY
Qty: 30 TABLET | Refills: 11 | Status: SHIPPED | OUTPATIENT
Start: 2023-12-17 | End: 2024-12-16

## 2023-12-17 RX ADMIN — AMLODIPINE BESYLATE 5 MG: 5 TABLET ORAL at 08:12

## 2023-12-17 RX ADMIN — LOSARTAN POTASSIUM 50 MG: 25 TABLET, FILM COATED ORAL at 08:12

## 2023-12-17 RX ADMIN — VANCOMYCIN HYDROCHLORIDE 1250 MG: 1.25 INJECTION, POWDER, LYOPHILIZED, FOR SOLUTION INTRAVENOUS at 10:12

## 2023-12-17 RX ADMIN — TERBINAFINE TABLETS 250 MG 250 MG: 250 TABLET ORAL at 08:12

## 2023-12-17 NOTE — NURSING
[facility] [place]  Nurses Note -- 4 Eyes      12/17/2023       Skin assessed on: Q Shift      [x] No Pressure Injuries Present    [x]Prevention Measures Documented    [] Yes LDA  for Pressure Injury Previously documented     [] Yes New Pressure Injury Discovered   [] LDA for New Pressure Injury Added      Attending RN:  Socorro Sanderson LPN     Second RN:  Gracy DavisRN

## 2023-12-17 NOTE — NURSING
Pt awake with daughter Dara at bedside.     AVS virtually reviewed with patient in its entirety with emphasis on medications, follow-up appointments and reasons to return to the ED or contact the [facility] On Call Nurse Care Line. Patient also encouraged to utilize their patient portal. Ease and convenience of use reiterated. Education complete and patient voiced understanding. All questions answered. Discharge teaching complete.

## 2023-12-17 NOTE — PLAN OF CARE
Patient is ready and clear for discharge from Case Management's perspective; informed patient's nurse, Socorro. Discussed and provided patient with written discharge instruction and education.

## 2023-12-17 NOTE — PLAN OF CARE
[facility] [place] Telemetry    HOME HEALTH ORDERS  FACE TO FACE ENCOUNTER    Patient Name: Elena Xiao  YOB: 1945    PCP: No, Primary Doctor   PCP Address: None  PCP Phone Number: None  PCP Fax: None    Encounter Date: 12/17/2023    Admit to Home Health    Diagnoses:  Active Hospital Problems    Diagnosis  POA    *Bilateral lower leg cellulitis [L03.116, L03.115]  Yes    Onychomycosis [B35.1]  Yes    HTN (hypertension) [I10]  Yes    Blindness, bilateral [H54.3]  Yes    Asymptomatic bradycardia [R00.1]  Yes    Debility [R53.81]  Yes      Resolved Hospital Problems   No resolved problems to display.       Future Appointments   Date Time Provider Department Center   2/21/2024 10:30 AM Demario Sheehan MD [facility] OPHTHAL Trevon Pimentel           I have seen and examined this patient face to face today. My clinical findings that support the need for the home health skilled services and home bound status are the following:  Weakness/numbness causing balance and gait disturbance due to Infection and Weakness/Debility making it taxing to leave home.    Allergies:Review of patient's allergies indicates:  No Known Allergies    Diet: regular diet    Activities: activity as tolerated    Nursing:   SN to complete comprehensive assessment including routine vital signs. Instruct on disease process and s/s of complications to report to MD. Review/verify medication list sent home with the patient at time of discharge  and instruct patient/caregiver as needed. Frequency may be adjusted depending on start of care date.    Notify MD if SBP > 160 or < 90; DBP > 90 or < 50; HR > 120 or < 50; Temp > 101;       CONSULTS:    Physical Therapy to evaluate and treat. Evaluate for home safety and equipment needs; Establish/upgrade home exercise program. Perform / instruct on therapeutic exercises, gait training, transfer training, and Range of Motion.  Occupational Therapy to evaluate and treat. Evaluate home environment for safety and  equipment needs. Perform/Instruct on transfers, ADL training, ROM, and therapeutic exercises.  Aide to provide assistance with personal care, ADLs, and vital signs.    MISCELLANEOUS CARE:  Routine Skin for Bedridden Patients: Instruct patient/caregiver to apply moisture barrier cream to all skin folds and wet areas in perineal area daily and after baths and all bowel movements.    WOUND CARE ORDERS  Local wound care to bullous site right lower leg every 2 days and prn- Cleanse with Vashe. Apply Aquacel hydrofiber over draining site and cover with Mepilex sacral border dressing.      Medications: Review discharge medications with patient and family and provide education.      Current Discharge Medication List        START taking these medications    Details   amLODIPine (NORVASC) 5 MG tablet Take 1 tablet (5 mg total) by mouth once daily.  Qty: 30 tablet, Refills: 11    Comments: .      cefUROXime (CEFTIN) 500 MG tablet Take 1 tablet (500 mg total) by mouth 2 (two) times daily. for 14 days  Qty: 28 tablet, Refills: 0      losartan (COZAAR) 50 MG tablet Take 1 tablet (50 mg total) by mouth 2 (two) times a day.  Qty: 180 tablet, Refills: 3    Comments: .      terbinafine HCL (LAMISIL) 250 mg tablet Take 1 tablet (250 mg total) by mouth once daily.  Qty: 30 tablet, Refills: 0             I certify that this patient is confined to her home and needs intermittent skilled nursing care, physical therapy and occupational therapy.    Cheryl Espinosa MD  12/17/2023 11:55 AM  Department of Hospital medicine

## 2023-12-17 NOTE — NURSING
Bedside Report given to night nurse VISH Dubose. Walking rounds completed. Visualized and assessed patient NAD noted. Safety precautions maintained and call light within reach.     Chart check completed.

## 2023-12-17 NOTE — PROGRESS NOTES
"      [facility] Medicine  Telemedicine Progress Note    Patient Name: Elena Xiao  MRN: [id number]  Patient Class: IP- Inpatient   Admission Date: 12/13/2023  Length of Stay: 2 days  Attending Physician: Cheryl Espinosa MD  Primary Care Provider: Kady, Primary Doctor          Subjective:     Principal Problem:Bilateral lower leg cellulitis        HPI:  Ms. Xiao is a 78 yr old female with medical  hx of HTN who presented from the [facility] ED for c/o BLE swelling, erythema x 1 week, along with a fluid filled blister on her RLE x 2 days. She stated that today she noted burning of her BLE, which prompted her ED visit. 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 She has not cut her toenails since 2019 and has not seen her PCP since 2020. She states "I have never taken prescription medication my whole life." She endorses that she was previously prescribed HTN medication, but states she didn't take the medication because it made her dizzy. Along with her lower extremity erythema, and pain, she endorses rhinorrhea, but denies any headaches, chest pain, shortness of breath, sore throat, trouble swallowing, nausea, vomiting, abdominal pain, dysuria, hematuria and/or any other associated symptoms. She denies any known drug allergies.    In the ED:  Patient is afebrile without leukocytosis, hypertensive, CBC and CMP without abnormalities. Administered IV Zosyn x 1. Lower Extremity US noted to be negative for DVTs.    Elena Xiao was placed under observation for further management of lower extremity cellulitis and podiatric evaluation.    Overview/Hospital Course:  No notes on file    Interval History: Pt noted to be afebrile and hemodynamically stable. Stable respiratory status. No acute events overnight. Pt still has erythema over BLE, felt slight improvement noted.  Cultures with no growth today.  Continue IV abx " and terbinafine.       Review of Systems   Constitutional:  Positive for activity change and fatigue. Negative for fever.   Respiratory:  Negative for shortness of breath.    Cardiovascular:  Negative for chest pain.   Gastrointestinal:  Negative for abdominal pain.   Skin:  Positive for color change.   Neurological:  Negative for dizziness and headaches.   All other systems reviewed and are negative.    Objective:     Vital Signs (Most Recent):  Temp: 98.1 °F (36.7 °C) (12/16/23 2318)  Pulse: 86 (12/16/23 2318)  Resp: 20 (12/16/23 2318)  BP: (!) 170/80 (12/16/23 2338)  SpO2: 98 % (12/16/23 2318) Vital Signs (24h Range):  Temp:  [97.8 °F (36.6 °C)-98.3 °F (36.8 °C)] 98.1 °F (36.7 °C)  Pulse:  [64-89] 86  Resp:  [18-20] 20  SpO2:  [96 %-99 %] 98 %  BP: (142-195)/(60-89) 170/80     Weight: 63.7 kg (140 lb 6.9 oz)  Body mass index is 27.43 kg/m².    Intake/Output Summary (Last 24 hours) at 12/16/2023 2355  Last data filed at 12/16/2023 1807  Gross per 24 hour   Intake 600 ml   Output 1850 ml   Net -1250 ml           Physical Exam  Vitals and nursing note reviewed.   Constitutional:       Appearance: Normal appearance.   HENT:      Head: Normocephalic and atraumatic.   Eyes:      Extraocular Movements: Extraocular movements intact.      Pupils: Pupils are equal, round, and reactive to light.   Cardiovascular:      Rate and Rhythm: Normal rate and regular rhythm.   Pulmonary:      Effort: Pulmonary effort is normal. No respiratory distress.   Abdominal:      General: There is no distension.   Musculoskeletal:         General: Normal range of motion.      Cervical back: Normal range of motion.      Comments: BLE erythema and edema    Neurological:      General: No focal deficit present.      Mental Status: She is alert and oriented to person, place, and time.   Psychiatric:         Mood and Affect: Mood normal.         Behavior: Behavior normal.             Significant Labs: All pertinent labs within the past 24 hours  "have been reviewed.  CBC:   Recent Labs   Lab 12/15/23  0408   WBC 6.41   HGB 12.1   HCT 37.8          CMP:   Recent Labs   Lab 12/15/23  0408      K 3.9      CO2 27   GLU 89   BUN 18   CREATININE 0.8   CALCIUM 8.5*   ANIONGAP 8       Cardiac Markers: No results for input(s): "CKMB", "MYOGLOBIN", "BNP", "TROPISTAT" in the last 48 hours.    Significant Imaging: I have reviewed all pertinent imaging results/findings within the past 24 hours.    Assessment/Plan:      * Bilateral lower leg cellulitis  Patient presented to the [facility] ED c/o of erythema and edema of her bilateral lower extremities x 1 week as well as a large blister on her right medial lower extremity x 2 days.  She stated that she noticed a burning sensation in her legs, which prompted her ED visit.   She states that she has not seen a doctor since 2020, and states that the last time she cut her toenails was back in 2019  Patient currently lives with her daughter who assists patient around the house.  Lower extremity US ordered which is negative for DVTs.  Blood cultures NGTD  Initially started on IV Vanc and Zosyn  Elevate lower extremities  Podiatry and Wound Care and ID consulted; appreciate recommendations.  Per ID: - okay to continue vancomycin goal trough 15-20 mcg/ml with dosing per pharmacy while admitted  - stop pip-tazo  - start ceftriaxone 2 grams q24 hours  - at discharge, can convert to cefuroxime 500 mg BID to complete a 14 day course  - sent interdigital swabs for bacterial and fungal cultures   - arrange outpatient ID follow up on 12/27/23    -pt still without significant improvement. 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  SW consulted for family assessment and discharge planning.    Asymptomatic bradycardia  Patient noted to have HR: " "45-47 upon admission to the floor, but not in any distress.  Echocardiogram ordered  Maintain on telemetry.    Blindness, bilateral  Noted on exam; patient states she is "partially blind in both eyes and can only see light."      HTN (hypertension)  Chronic, uncontrolled. Latest blood pressure and vitals reviewed-     Temp:  [97.1 °F (36.2 °C)-98 °F (36.7 °C)]   Pulse:  [42-80]   Resp:  [17-18]   BP: (140-177)/(51-82)   SpO2:  [96 %-98 %] .   Home meds for hypertension were reviewed and noted below.   Start amlodipine   Continue losartan      While in the hospital, will manage blood pressure as follows; Losartan added to regimen.    Will utilize p.r.n. blood pressure medication only if patient's blood pressure greater than 180/110 and she develops symptoms such as worsening chest pain or shortness of breath.    Patient states she was previously prescribed a medication for HTN, but never continued it due to it making her feel light headed.      VTE Risk Mitigation (From admission, onward)           Ordered     IP VTE HIGH RISK PATIENT  Once         12/13/23 0634     Place sequential compression device  Until discontinued         12/13/23 0634                          I have completed this tele-visit without the assistance of a telepresenter.    The attending portion of this evaluation, treatment, and documentation was performed per Cheryl Espinosa MD via Telemedicine AudioVisual using the secure [employer] software platform with 2 way audio/video. The provider was located off-site and the patient is located in the hospital. The aforementioned video software was utilized to document the relevant history and physical exam    Cheryl Espinosa MD  Department of Hospital Medicine   [facility] [place] - Telemetry    "

## 2023-12-17 NOTE — PLAN OF CARE
Problem: Adult Inpatient Plan of Care  Goal: Absence of Hospital-Acquired Illness or Injury  Intervention: Identify and Manage Fall Risk  Flowsheets (Taken 12/17/2023 0408)  Safety Promotion/Fall Prevention:   assistive device/personal item within reach   diversional activities provided   Fall Risk reviewed with patient/family   medications reviewed   nonskid shoes/socks when out of bed   room near unit station   side rails raised x 3  Intervention: Prevent Skin Injury  Flowsheets (Taken 12/17/2023 0408)  Body Position: position changed independently  Intervention: Prevent and Manage VTE (Venous Thromboembolism) Risk  Flowsheets (Taken 12/17/2023 0408)  Activity Management: Rolling - L1  Intervention: Prevent Infection  Flowsheets (Taken 12/17/2023 0408)  Infection Prevention:   environmental surveillance performed   hand hygiene promoted   single patient room provided  Goal: Optimal Comfort and Wellbeing  Intervention: Monitor Pain and Promote Comfort  Flowsheets (Taken 12/17/2023 0408)  Pain Management Interventions: care clustered  Intervention: Provide Person-Centered Care  Flowsheets (Taken 12/17/2023 0408)  Trust Relationship/Rapport: care explained     Problem: Impaired Wound Healing  Goal: Optimal Wound Healing  Intervention: Promote Wound Healing  Flowsheets (Taken 12/17/2023 0408)  Activity Management: Rolling - L1  Pain Management Interventions: care clustered     Problem: Skin Injury Risk Increased  Goal: Skin Health and Integrity  Intervention: Optimize Skin Protection  Flowsheets (Taken 12/17/2023 0408)  Head of Bed (HOB) Positioning: HOB elevated

## 2023-12-17 NOTE — SUBJECTIVE & OBJECTIVE
Interval History: Pt noted to be afebrile and hemodynamically stable. Stable respiratory status. No acute events overnight. Pt still has erythema over BLE, felt slight improvement noted.  Cultures with no growth today.  Continue IV abx and terbinafine.       Review of Systems   Constitutional:  Positive for activity change and fatigue. Negative for fever.   Respiratory:  Negative for shortness of breath.    Cardiovascular:  Negative for chest pain.   Gastrointestinal:  Negative for abdominal pain.   Skin:  Positive for color change.   Neurological:  Negative for dizziness and headaches.   All other systems reviewed and are negative.    Objective:     Vital Signs (Most Recent):  Temp: 98.1 °F (36.7 °C) (12/16/23 2318)  Pulse: 86 (12/16/23 2318)  Resp: 20 (12/16/23 2318)  BP: (!) 170/80 (12/16/23 2338)  SpO2: 98 % (12/16/23 2318) Vital Signs (24h Range):  Temp:  [97.8 °F (36.6 °C)-98.3 °F (36.8 °C)] 98.1 °F (36.7 °C)  Pulse:  [64-89] 86  Resp:  [18-20] 20  SpO2:  [96 %-99 %] 98 %  BP: (142-195)/(60-89) 170/80     Weight: 63.7 kg (140 lb 6.9 oz)  Body mass index is 27.43 kg/m².    Intake/Output Summary (Last 24 hours) at 12/16/2023 2355  Last data filed at 12/16/2023 1807  Gross per 24 hour   Intake 600 ml   Output 1850 ml   Net -1250 ml           Physical Exam  Vitals and nursing note reviewed.   Constitutional:       Appearance: Normal appearance.   HENT:      Head: Normocephalic and atraumatic.   Eyes:      Extraocular Movements: Extraocular movements intact.      Pupils: Pupils are equal, round, and reactive to light.   Cardiovascular:      Rate and Rhythm: Normal rate and regular rhythm.   Pulmonary:      Effort: Pulmonary effort is normal. No respiratory distress.   Abdominal:      General: There is no distension.   Musculoskeletal:         General: Normal range of motion.      Cervical back: Normal range of motion.      Comments: BLE erythema and edema    Neurological:      General: No focal deficit present.      " Mental Status: She is alert and oriented to person, place, and time.   Psychiatric:         Mood and Affect: Mood normal.         Behavior: Behavior normal.             Significant Labs: All pertinent labs within the past 24 hours have been reviewed.  CBC:   Recent Labs   Lab 12/15/23  0408   WBC 6.41   HGB 12.1   HCT 37.8          CMP:   Recent Labs   Lab 12/15/23  0408      K 3.9      CO2 27   GLU 89   BUN 18   CREATININE 0.8   CALCIUM 8.5*   ANIONGAP 8       Cardiac Markers: No results for input(s): "CKMB", "MYOGLOBIN", "BNP", "TROPISTAT" in the last 48 hours.    Significant Imaging: I have reviewed all pertinent imaging results/findings within the past 24 hours.  "

## 2023-12-17 NOTE — PLAN OF CARE
Patient to discharge home with home health; contacted patient who requested first available provider. Contacted [facility] to determine if they would be able to accept patient; awaiting return call from [facility]. Faxed plan of care and patient information to [facility] for review.    [facility] accepted patient for Home Health services.

## 2023-12-17 NOTE — PLAN OF CARE
12/17/23 1308   Final Note   Assessment Type Final Discharge Note   Anticipated Discharge Disposition Home-Health  ([facility]; follow-up)   What phone number can be called within the next 1-3 days to see how you are doing after discharge?   ([phone])   Hospital Resources/Appts/Education Provided Provided patient/caregiver with written discharge plan information;Appointments scheduled and added to AVS;Provided education on problems/symptoms using teachback;Post-Acute resouces added to AVS   Post-Acute Status   Post-Acute Authorization Home Health  (Home; [facility]; Follow-ups)   Home Health Status Set-up Complete/Auth obtained   Discharge Delays None known at this time

## 2023-12-17 NOTE — PLAN OF CARE
12/17/23 1305   Post-Acute Status   Post-Acute Authorization Home Health  (Home with family; follow-up; [facility] )   Home Health Status Set-up Complete/Auth obtained   Hospital Resources/Appts/Education Provided Provided patient/caregiver with written discharge plan information;Appointments scheduled and added to AVS;Provided education on problems/symptoms using teachback;Post-Acute resouces added to AVS   Discharge Delays None known at this time   Discharge Plan   Discharge Plan A Home with family;Home Health  ([facility] : Follow-up)   Discharge Plan B Home with family;Home Health  ([facility] : follow-up)

## 2023-12-18 ENCOUNTER — TELEPHONE (OUTPATIENT)
Dept: INFECTIOUS DISEASES | Facility: CLINIC | Age: 78
End: 2023-12-18
Payer: MEDICARE

## 2023-12-18 NOTE — TELEPHONE ENCOUNTER
Called patient said they will give a call back on when they can come    ----- Message from Tanisha Gee MA sent at 12/18/2023 11:25 AM CST -----    ----- Message -----  From: Marli Ramos MD  Sent: 12/16/2023   8:05 AM CST  To: Tanisha Gee MA    Could you schedule this patient to see me for cellulitis 12/27, 12/28 or 12/29?    ThanksMarli

## 2023-12-28 NOTE — HOSPITAL COURSE
"Bilateral lower leg cellulitis  Patient presented to the [facility] ED c/o of erythema and edema of her bilateral lower extremities x 1 week as well as a large blister on her right medial lower extremity x 2 days.  She stated that she noticed a burning sensation in her legs, which prompted her ED visit.   She states that she has not seen a doctor since 2020, and states that the last time she cut her toenails was back in 2019  Patient currently lives with her daughter who assists patient around the house.  Lower extremity US ordered which is negative for DVTs.  Blood cultures NGTD  Initially started on IV Vanc and Zosyn  Elevate lower extremities  Podiatry and Wound Care and ID consulted; appreciate recommendations.  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  SW consulted for family assessment and discharge planning.   set up at [facility]     Asymptomatic bradycardia  Patient noted to have HR: 45-47 upon admission to the floor, but not in any distress.  Echocardiogram ordered  Stable prior to dc     Blindness, bilateral  Noted on exam; patient states she is "partially blind in both eyes and can only see light."        HTN (hypertension)  Chronic, uncontrolled. Latest blood pressure and vitals reviewed-    antihypertensives adjusted this admission      "

## 2023-12-28 NOTE — DISCHARGE SUMMARY
"[facility] Medicine  Discharge Summary      Patient Name: Elena Xiao  MRN: [id number]  Patient Class: IP- Inpatient  Admission Date: 12/13/2023  Hospital Length of Stay: 2 days  Discharge Date and Time: 12/17/2023  5:35 PM  Attending Physician: Kady att. providers found   Discharging Provider: Cheryl Espinosa MD  Primary Care Provider: Kady, Primary Doctor      HPI:   Ms. Xiao is a 78 yr old female with medical  hx of HTN who presented from the [facility] ED for c/o BLE swelling, erythema x 1 week, along with a fluid filled blister on her RLE x 2 days. She stated that today she noted burning of her BLE, which prompted her ED visit. 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 She has not cut her toenails since 2019 and has not seen her PCP since 2020. She states "I have never taken prescription medication my whole life." She endorses that she was previously prescribed HTN medication, but states she didn't take the medication because it made her dizzy. Along with her lower extremity erythema, and pain, she endorses rhinorrhea, but denies any headaches, chest pain, shortness of breath, sore throat, trouble swallowing, nausea, vomiting, abdominal pain, dysuria, hematuria and/or any other associated symptoms. She denies any known drug allergies.    In the ED:  Patient is afebrile without leukocytosis, hypertensive, CBC and CMP without abnormalities. Administered IV Zosyn x 1. Lower Extremity US noted to be negative for DVTs.    Elena Xiao was placed under observation for further management of lower extremity cellulitis and podiatric evaluation.    * No surgery found *      Hospital Course:   Bilateral lower leg cellulitis  Patient presented to the [facility] ED c/o of erythema and edema of her bilateral lower extremities x 1 week as well as a large blister on her right medial lower extremity x 2 days.  She " "stated that she noticed a burning sensation in her legs, which prompted her ED visit.   She states that she has not seen a doctor since 2020, and states that the last time she cut her toenails was back in 2019  Patient currently lives with her daughter who assists patient around the house.  Lower extremity US ordered which is negative for DVTs.  Blood cultures NGTD  Initially started on IV Vanc and Zosyn  Elevate lower extremities  Podiatry and Wound Care and ID consulted; appreciate recommendations.  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  SW consulted for family assessment and discharge planning.  HH set up at DC     Asymptomatic bradycardia  Patient noted to have HR: 45-47 upon admission to the floor, but not in any distress.  Echocardiogram ordered  Stable prior to dc     Blindness, bilateral  Noted on exam; patient states she is "partially blind in both eyes and can only see light."        HTN (hypertension)  Chronic, uncontrolled. Latest blood pressure and vitals reviewed-    antihypertensives adjusted this admission         Goals of Care Treatment Preferences:  Code Status: Full Code      Consults:   Consults (From admission, onward)          Status Ordering Provider     Inpatient consult to Infectious Diseases  Once        Provider:  Marli Ramos RN    Completed FUAD LING     Inpatient virtual consult to Hospital Medicine  Once        Provider:  Senthil Coker MD    Completed CARLOS HERNANDEZ " P.     Inpatient consult to Vascular Surgery  Once        Provider:  Alonso Yeboah MD    Completed YOANNA LAZO     Inpatient consult to Social Work  Once        Provider:  (Not yet assigned)    CARLOS Matthews     Inpatient consult to Podiatry  Once        Provider:  Yoanna Lazo, KRISTYN    Completed CARLOS HERNANDEZ            No new Assessment & Plan notes have been filed under this hospital service since the last note was generated.  Service: Hospital Medicine    Final Active Diagnoses:    Diagnosis Date Noted POA    PRINCIPAL PROBLEM:  Bilateral lower leg cellulitis [L03.116, L03.115] 12/13/2023 Yes    Onychomycosis [B35.1] 12/14/2023 Yes    HTN (hypertension) [I10] 12/13/2023 Yes    Blindness, bilateral [H54.3] 12/13/2023 Yes    Asymptomatic bradycardia [R00.1] 12/13/2023 Yes    Debility [R53.81] 12/13/2023 Yes      Problems Resolved During this Admission:       Discharged Condition: stable    Disposition: Home-Health Care [facility]    Follow Up:   Follow-up Information       [facility] Follow up.    Why: Home Health  Contact information:   [address]  [facility] [id number] [phone]                           Patient Instructions:      Ambulatory referral/consult to Podiatry   Standing Status: Future   Referral Priority: Routine Referral Type: Consultation   Referral Reason: Specialty Services Required   Requested Specialty: Podiatry   Number of Visits Requested: 1     Ambulatory referral/consult to Infectious Disease   Standing Status: Future   Referral Priority: Routine Referral Type: Consultation   Referral Reason: Specialty Services Required   Requested Specialty: Infectious Diseases   Number of Visits Requested: 1     Diet Cardiac     Notify your health care provider if you experience any of the following:  temperature >100.4     Notify your health care provider if you experience any of the following:  persistent nausea and vomiting or diarrhea     Notify  "your health care provider if you experience any of the following:  severe uncontrolled pain     Notify your health care provider if you experience any of the following:  redness, tenderness, or signs of infection (pain, swelling, redness, odor or green/yellow discharge around incision site)     Notify your health care provider if you experience any of the following:  difficulty breathing or increased cough     Notify your health care provider if you experience any of the following:  severe persistent headache     Notify your health care provider if you experience any of the following:  worsening rash     Notify your health care provider if you experience any of the following:  persistent dizziness, light-headedness, or visual disturbances     Notify your health care provider if you experience any of the following:  increased confusion or weakness     Send follow up & questions to   Order Comments: Patient's primary care physician: No, Primary Doctor     Activity as tolerated       Significant Diagnostic Studies: Labs: CMP No results for input(s): "NA", "K", "CL", "CO2", "GLU", "BUN", "CREATININE", "CALCIUM", "PROT", "ALBUMIN", "BILITOT", "ALKPHOS", "AST", "ALT", "ANIONGAP", "ESTGFRAFRICA", "EGFRNONAA" in the last 48 hours. and CBC No results for input(s): "WBC", "HGB", "HCT", "PLT" in the last 48 hours.    Pending Diagnostic Studies:       None           Medications:  Reconciled Home Medications:      Medication List        START taking these medications      amLODIPine 5 MG tablet  Commonly known as: NORVASC  Take 1 tablet (5 mg total) by mouth once daily.  Notes to patient: Blood pressure     cefUROXime 500 MG tablet  Commonly known as: CEFTIN  Take 1 tablet (500 mg total) by mouth 2 (two) times daily. for 14 days  Notes to patient: antibiotic     losartan 50 MG tablet  Commonly known as: COZAAR  Take 1 tablet (50 mg total) by mouth 2 (two) times a day.  Notes to patient: Blood pressure, heart     terbinafine HCL " 250 mg tablet  Commonly known as: LAMISIL  Take 1 tablet (250 mg total) by mouth once daily.              Indwelling Lines/Drains at time of discharge:   Lines/Drains/Airways       None                   Time spent on the discharge of patient: 39 minutes         The attending portion of this evaluation, treatment, and documentation was performed per Cheryl Espinosa MD via Telemedicine AudioVisual using the secure [employer] software platform with 2 way audio/video. The provider was located off-site and the patient is located in the hospital. The aforementioned video software was utilized to document the relevant history and physical exam    Cheryl Espinosa MD  Department of Hospital Medicine  [facility]

## 2024-01-04 LAB
LEFT ABI: 0.92
LEFT DORSALIS PEDIS: 138 MMHG
LEFT POSTERIOR TIBIAL: 149 MMHG
LEFT TBI: 0.32
LEFT TOE PRESSURE: 52 MMHG
RIGHT ABI: 0.83
RIGHT ARM BP: 162 MMHG
RIGHT DORSALIS PEDIS: 135 MMHG
RIGHT POSTERIOR TIBIAL: 122 MMHG
RIGHT TBI: 0.61
RIGHT TOE PRESSURE: 99 MMHG

## 2024-01-15 LAB — FUNGUS SPEC CULT: NORMAL

## 2024-01-23 ENCOUNTER — EXTERNAL HOME HEALTH (OUTPATIENT)
Dept: HOME HEALTH SERVICES | Facility: HOSPITAL | Age: 79
End: 2024-01-23
Payer: MEDICARE

## 2024-02-21 ENCOUNTER — OFFICE VISIT (OUTPATIENT)
Dept: OPHTHALMOLOGY | Facility: CLINIC | Age: 79
End: 2024-02-21
Payer: MEDICARE

## 2024-02-21 DIAGNOSIS — H25.23 AGE-RELATED HYPERMATURE CATARACT OF BOTH EYES: Primary | ICD-10-CM

## 2024-02-21 PROCEDURE — 99204 OFFICE O/P NEW MOD 45 MIN: CPT | Mod: S$GLB,,, | Performed by: OPHTHALMOLOGY

## 2024-02-21 PROCEDURE — 92136 OPHTHALMIC BIOMETRY: CPT | Mod: RT,S$GLB,, | Performed by: OPHTHALMOLOGY

## 2024-02-21 RX ORDER — PHENYLEPHRINE HYDROCHLORIDE 100 MG/ML
1 SOLUTION/ DROPS OPHTHALMIC
Status: CANCELLED | OUTPATIENT
Start: 2024-02-21

## 2024-02-21 RX ORDER — PREDNISOLONE ACETATE-GATIFLOXACIN-BROMFENAC .75; 5; 1 MG/ML; MG/ML; MG/ML
1 SUSPENSION/ DROPS OPHTHALMIC 3 TIMES DAILY
Qty: 5 ML | Refills: 3 | Status: SHIPPED | OUTPATIENT
Start: 2024-02-21

## 2024-02-21 RX ORDER — SODIUM CHLORIDE 0.9 % (FLUSH) 0.9 %
10 SYRINGE (ML) INJECTION
Status: DISCONTINUED | OUTPATIENT
Start: 2024-02-21 | End: 2024-04-11 | Stop reason: ALTCHOICE

## 2024-02-21 RX ORDER — CYCLOP/TROP/PROPA/PHEN/KET/WAT 1-1-0.1%
1 DROPS (EA) OPHTHALMIC (EYE)
Status: CANCELLED | OUTPATIENT
Start: 2024-02-21

## 2024-02-21 RX ORDER — MOXIFLOXACIN 5 MG/ML
1 SOLUTION/ DROPS OPHTHALMIC
Status: CANCELLED | OUTPATIENT
Start: 2024-02-21

## 2024-02-21 NOTE — PROGRESS NOTES
HPI    Patient is here today for a cornea evaluation per Dr. Lees.  OS went bad in 2019 and OD later from cataract per Dr at [facility].    Sunlight bothersome. VA OU bad for years.  Last edited by Mary Faustin on 2/21/2024 11:40 AM.            Assessment /Plan     For exam results, see Encounter Report.    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     Specifically, this patient describes difficulty with:  - driving safely at night  - reading road signs  - reading small print  - deciphering medicine bottles  - reading the newspaper  - using the phone  - reading texts     Risks, benefits, and alternatives to cataract surgery were discussed and the consent reviewed. IOL options were discussed, including ATIOLs and the associated side effects and additional patient cost associated with them.   IOL Selections:   Right eye  IOL: CNA0T0 23.5     Left eye  IOL: CNA0T0 24.0    Pt wishes to have RIGHT eye done first.  WHITE CATARACT OU, TRYPAN BLUE  WHEELCHAIR BOUND

## 2024-03-14 ENCOUNTER — TELEPHONE (OUTPATIENT)
Dept: OPHTHALMOLOGY | Facility: CLINIC | Age: 79
End: 2024-03-14
Payer: MEDICARE

## 2024-03-14 DIAGNOSIS — H25.11 NUCLEAR SCLEROTIC CATARACT OF RIGHT EYE: Primary | ICD-10-CM

## 2024-03-27 ENCOUNTER — TELEPHONE (OUTPATIENT)
Dept: OPHTHALMOLOGY | Facility: CLINIC | Age: 79
End: 2024-03-27
Payer: MEDICARE

## 2024-03-27 DIAGNOSIS — H25.12 NUCLEAR SCLEROTIC CATARACT OF LEFT EYE: Primary | ICD-10-CM

## 2024-04-08 ENCOUNTER — TELEPHONE (OUTPATIENT)
Dept: OPHTHALMOLOGY | Facility: CLINIC | Age: 79
End: 2024-04-08
Payer: MEDICARE

## 2024-04-08 NOTE — TELEPHONE ENCOUNTER
Patient given arrival time of 8:15 am on Thursday April 11. Nothing to eat or drink after 11:59 pm.Start drops into the operative eye today. [address] [facility]

## 2024-04-10 NOTE — PRE-PROCEDURE INSTRUCTIONS
Unable to reach pt via phone.  Left voicemail with arrival time also informing pt of need for responsible  accompaniment and instructing pt to follow pre-procedure instructions provided via Voicemail.  No portal access available.      Dear Elena    Below you will find basic pre-procedure instructions in preparation for your procedure on 4/11/24 with Dr. Sheehan    You should have received your arrival time already from Dr's office.    - Nothing to eat or drink after midnight the night before your procedure until after your procedure, except AM meds with small sips of water.     - HOLD all oral Diabetic medications night before and morning of procedure  - HOLD all Insulin morning of procedure  - HOLD all Fluid pills morning of procedure  - HOLD all non-insulin shots until after surgery (Ozempic, Mounjaro, Trulicity, Victoza, Byetta, Wegovy and Adlyxin) (7 days prior)  - HOLD all vitamins, minerals and herbal supplements morning of procedure   - TAKE all B/P meds, EXCEPT those that contain a fluid pill (ex. Lasix, Hydroclorothiazide/HCTZ, Spirnolactone)  - USE inhalers as needed and bring AM of surgery  - USE eye drops as directed  -TAKE blood thinner meds AM of surgery unless otherwise instructed by your provider to not take     - Shower and wash face with dial soap for 3 mins PM prior and AM of surgery  - No powder, lotions, creams, oils, gels, ointments, makeup,  or jewelry    - Wear comfortable clothing (button up shirt)     (Patient is required to have a responsible ride to transport home, ride may not leave while patient is in surgery)     -- [facility], 2nd floor Surgery Center, located   @ [address] [place] [address] Floor Registration      If you have any questions or concerns please feel free to contact your surgeon's office.        Please reply to this message as receipt of delivery.    Catina, LPN [facility]  Pre-Admit - Anesthesia Dept

## 2024-04-11 ENCOUNTER — HOSPITAL ENCOUNTER (OUTPATIENT)
Facility: HOSPITAL | Age: 79
Discharge: HOME | End: 2024-04-11
Attending: OPHTHALMOLOGY | Admitting: OPHTHALMOLOGY
Payer: MEDICARE

## 2024-04-11 VITALS
TEMPERATURE: 98 F | RESPIRATION RATE: 18 BRPM | OXYGEN SATURATION: 97 % | HEART RATE: 62 BPM | SYSTOLIC BLOOD PRESSURE: 165 MMHG | DIASTOLIC BLOOD PRESSURE: 74 MMHG

## 2024-04-11 DIAGNOSIS — H25.11 NUCLEAR SCLEROTIC CATARACT OF RIGHT EYE: Primary | ICD-10-CM

## 2024-04-11 DIAGNOSIS — H25.23 AGE-RELATED HYPERMATURE CATARACT OF BOTH EYES: ICD-10-CM

## 2024-04-11 PROCEDURE — 99900035 HC TECH TIME PER 15 MIN (STAT)

## 2024-04-11 PROCEDURE — 66982 XCAPSL CTRC RMVL CPLX WO ECP: CPT | Mod: RT,,, | Performed by: OPHTHALMOLOGY

## 2024-04-11 PROCEDURE — 63600175 PHARM REV CODE 636 W HCPCS: Performed by: OPHTHALMOLOGY

## 2024-04-11 PROCEDURE — 25000003 PHARM REV CODE 250: Performed by: OPHTHALMOLOGY

## 2024-04-11 PROCEDURE — 36000706: Performed by: OPHTHALMOLOGY

## 2024-04-11 PROCEDURE — 99152 MOD SED SAME PHYS/QHP 5/>YRS: CPT | Performed by: OPHTHALMOLOGY

## 2024-04-11 PROCEDURE — 71000015 HC POSTOP RECOV 1ST HR: Performed by: OPHTHALMOLOGY

## 2024-04-11 PROCEDURE — 36000707: Performed by: OPHTHALMOLOGY

## 2024-04-11 PROCEDURE — V2632 POST CHMBR INTRAOCULAR LENS: HCPCS | Performed by: OPHTHALMOLOGY

## 2024-04-11 PROCEDURE — 94761 N-INVAS EAR/PLS OXIMETRY MLT: CPT | Mod: 59

## 2024-04-11 DEVICE — LENS CLAREON AUTONOME 23.5D: Type: IMPLANTABLE DEVICE | Site: EYE | Status: FUNCTIONAL

## 2024-04-11 RX ORDER — ACETAMINOPHEN 325 MG/1
650 TABLET ORAL EVERY 4 HOURS PRN
Status: DISCONTINUED | OUTPATIENT
Start: 2024-04-11 | End: 2024-04-11 | Stop reason: HOSPADM

## 2024-04-11 RX ORDER — TETRACAINE HYDROCHLORIDE 5 MG/ML
1 SOLUTION OPHTHALMIC
Status: COMPLETED | OUTPATIENT
Start: 2024-04-11 | End: 2024-04-11

## 2024-04-11 RX ORDER — LIDOCAIN/PHENYLEPH/BSS NO.2/PF 1 %-1.5 %
SYRINGE (ML) INTRAOCULAR
Status: DISCONTINUED | OUTPATIENT
Start: 2024-04-11 | End: 2024-04-11 | Stop reason: HOSPADM

## 2024-04-11 RX ORDER — PROPARACAINE HYDROCHLORIDE 5 MG/ML
SOLUTION/ DROPS OPHTHALMIC
Status: DISCONTINUED | OUTPATIENT
Start: 2024-04-11 | End: 2024-04-11 | Stop reason: HOSPADM

## 2024-04-11 RX ORDER — MOXIFLOXACIN 5 MG/ML
1 SOLUTION/ DROPS OPHTHALMIC
Status: COMPLETED | OUTPATIENT
Start: 2024-04-11 | End: 2024-04-11

## 2024-04-11 RX ORDER — MOXIFLOXACIN 5 MG/ML
SOLUTION/ DROPS OPHTHALMIC
Status: DISCONTINUED | OUTPATIENT
Start: 2024-04-11 | End: 2024-04-11 | Stop reason: HOSPADM

## 2024-04-11 RX ORDER — MOXIFLOXACIN 5 MG/ML
1 SOLUTION/ DROPS OPHTHALMIC
Status: DISCONTINUED | OUTPATIENT
Start: 2024-04-11 | End: 2024-04-11 | Stop reason: HOSPADM

## 2024-04-11 RX ORDER — PROPARACAINE HYDROCHLORIDE 5 MG/ML
1 SOLUTION/ DROPS OPHTHALMIC
Status: DISCONTINUED | OUTPATIENT
Start: 2024-04-11 | End: 2024-04-11 | Stop reason: HOSPADM

## 2024-04-11 RX ORDER — LIDOCAINE HYDROCHLORIDE 40 MG/ML
INJECTION, SOLUTION RETROBULBAR
Status: DISCONTINUED | OUTPATIENT
Start: 2024-04-11 | End: 2024-04-11 | Stop reason: HOSPADM

## 2024-04-11 RX ORDER — HYDRALAZINE HYDROCHLORIDE 20 MG/ML
10 INJECTION INTRAMUSCULAR; INTRAVENOUS
Status: COMPLETED | OUTPATIENT
Start: 2024-04-11 | End: 2024-04-11

## 2024-04-11 RX ORDER — TROPICAMIDE 10 MG/ML
1 SOLUTION/ DROPS OPHTHALMIC
Status: COMPLETED | OUTPATIENT
Start: 2024-04-11 | End: 2024-04-11

## 2024-04-11 RX ORDER — MIDAZOLAM HYDROCHLORIDE 1 MG/ML
1 INJECTION, SOLUTION INTRAMUSCULAR; INTRAVENOUS
Status: DISCONTINUED | OUTPATIENT
Start: 2024-04-11 | End: 2024-04-11 | Stop reason: HOSPADM

## 2024-04-11 RX ORDER — PHENYLEPHRINE HYDROCHLORIDE 25 MG/ML
1 SOLUTION/ DROPS OPHTHALMIC
Status: COMPLETED | OUTPATIENT
Start: 2024-04-11 | End: 2024-04-11

## 2024-04-11 RX ADMIN — TETRACAINE HYDROCHLORIDE 1 DROP: 5 SOLUTION/ DROPS OPHTHALMIC at 09:04

## 2024-04-11 RX ADMIN — HYDRALAZINE HYDROCHLORIDE 10 MG: 20 INJECTION INTRAMUSCULAR; INTRAVENOUS at 10:04

## 2024-04-11 RX ADMIN — MOXIFLOXACIN OPHTHALMIC 1 DROP: 5 SOLUTION/ DROPS OPHTHALMIC at 09:04

## 2024-04-11 RX ADMIN — MIDAZOLAM HYDROCHLORIDE 1 MG: 1 INJECTION, SOLUTION INTRAMUSCULAR; INTRAVENOUS at 10:04

## 2024-04-11 RX ADMIN — PHENYLEPHRINE HYDROCHLORIDE 1 DROP: 25 SOLUTION/ DROPS OPHTHALMIC at 09:04

## 2024-04-11 RX ADMIN — MOXIFLOXACIN 1 DROP: 5 SOLUTION/ DROPS OPHTHALMIC at 11:04

## 2024-04-11 RX ADMIN — TROPICAMIDE 1 DROP: 10 SOLUTION/ DROPS OPHTHALMIC at 09:04

## 2024-04-11 NOTE — OP NOTE
SURGEON:  Demario Sheehan M.D.    PREOPERATIVE DIAGNOSIS:    Nuclear Sclerotic Cataract    POSTOPERATIVE DIAGNOSIS:    Nuclear Sclerotic Cataract    PROCEDURES:    Complex Phacoemulsification with  intraocular lens, right eye (29066)    DATE OF SURGERY: 04/11/2024    IMPLANT: CNA0T0 23.5    ANESTHESIA:  Moderate sedation with topical Lidocaine. Patient ID confirmed and re-evaluated the patient and anesthesia plan confirming it is suitable for the patient's condition and procedure.    COMPLICATIONS:  None    ESTIMATED BLOOD LOSS: None    SPECIMENS: None    INDICATIONS:    The patient has a history of painless progressive visual loss and difficulty with activities of daily living, which specifically include difficult driving at night due to glare and difficulty reading small print, secondary to cataract formation. After a thorough discussion of the risks, benefits, and alternatives to cataract surgery, including, but not limited to, the rare risks of infection, retinal detachment, hemorrhage, need for additional surgery, loss of vision, and even loss of the eye, the patient voices understanding and desires to proceed.    DESCRIPTION OF PROCEDURE:    The patients IOL calculations were reviewed, and the lens selection confirmed.   After verification and marking of the proper eye in the preop holding area, the patient was brought to the operating room in supine position where the eye was prepped and draped in standard sterile fashion with 5% Betadine and a lid speculum placed in the eye.   Topical 4% Lidocaine was used in addition to the preoperative anesthesia and the procedure was begun by the creation of a paracentesis incision through which viscoelastic was used to fill the anterior chamber.  Next, a keratome blade was used to create a triplanar temporal clear corneal incision and a cystotome and Utrata forceps used to fashion a continuous curvilinear capsulorrhexis.  Hydrodissection was carried out using the Martel  hydrodissection cannula and the nucleus was found to be mobile.  Phacoemulsification of the nucleus was carried out using a quick chop technique, and all remaining epinuclear and cortical material was removed.  The eye was then reformed with Viscoelastic and the  intraocular lens was implanted into the capsular bag.  All remaining viscoelastics were removed from the eye and at the end of the case the pupil was round, the lens was well-centered within the capsular bag and all wounds were found to be water tight.  Drops of Vigamox and Pred Forte were instilled and a shield was placed over the eye. The patient will follow up with Dr. Sheehan in the morning.    ADDENDUM: Because the patient had a dense cataract and loss of red reflex, trypan blue was used to stain the anterior capsule.

## 2024-04-11 NOTE — DISCHARGE SUMMARY
Outcome: Successful outpatient ophthalmic surgical procedure  Preprinted Instructions given to patient.  Regular diet.  Activity: No restrictions  Meds: see Med Rec  Condition: stable  Follow up: 1 day with Dr Sheehan  Disposition: Home  Diagnosis: s/p eye surgery  Date of discharge: 04/11/2024

## 2024-04-11 NOTE — DISCHARGE INSTRUCTIONS
CATARACT SURGERY    POST-OPERATIVE INSTRUCTIONS    · Apply drops THREE times a day into operative eye for 30 days.    · DO NOT rub your eye    · Wear protective sunglasses during the day    · Resume moderate activity    · Bathe/shower/wash face normally    · DO NOT apply makeup around the operative eye for 1 week.         You should expect    - Blurry vision and halos for 24-48 hours    - Dilated pupil for 24-48 hours    - Scratchy feeling in the eye for 1-2 days    - Curved shadow in your peripheral vision for 2-3 weeks    - Occasional flickering of lights for up to 1 week    -If you experience severe pain or nausea, please call Dr Sheehan or the on-call doctor at [phone]    - Plan to see Dr Sheehan tomorrow .      [facility]    [address] [facility] [address]    ** Most patients can drive the next day, but if you do not feel comfortable driving, please arrange for transportation.

## 2024-04-12 ENCOUNTER — OFFICE VISIT (OUTPATIENT)
Dept: OPHTHALMOLOGY | Facility: CLINIC | Age: 79
End: 2024-04-12
Payer: MEDICARE

## 2024-04-12 DIAGNOSIS — H25.11 NUCLEAR SCLEROTIC CATARACT OF RIGHT EYE: ICD-10-CM

## 2024-04-12 DIAGNOSIS — Z98.890 POST-OPERATIVE STATE: Primary | ICD-10-CM

## 2024-04-12 PROCEDURE — 1101F PT FALLS ASSESS-DOCD LE1/YR: CPT | Mod: CPTII,S$GLB,, | Performed by: OPHTHALMOLOGY

## 2024-04-12 PROCEDURE — 3288F FALL RISK ASSESSMENT DOCD: CPT | Mod: CPTII,S$GLB,, | Performed by: OPHTHALMOLOGY

## 2024-04-12 PROCEDURE — 99999 PR PBB SHADOW E&M-EST. PATIENT-LVL II: CPT | Mod: PBBFAC,,, | Performed by: OPHTHALMOLOGY

## 2024-04-12 PROCEDURE — 99024 POSTOP FOLLOW-UP VISIT: CPT | Mod: S$GLB,,, | Performed by: OPHTHALMOLOGY

## 2024-04-12 PROCEDURE — 1126F AMNT PAIN NOTED NONE PRSNT: CPT | Mod: CPTII,S$GLB,, | Performed by: OPHTHALMOLOGY

## 2024-04-12 PROCEDURE — 1160F RVW MEDS BY RX/DR IN RCRD: CPT | Mod: CPTII,S$GLB,, | Performed by: OPHTHALMOLOGY

## 2024-04-12 PROCEDURE — 1159F MED LIST DOCD IN RCRD: CPT | Mod: CPTII,S$GLB,, | Performed by: OPHTHALMOLOGY

## 2024-04-12 NOTE — PROGRESS NOTES
HPI    DATE OF SURGERY: 04/11/2024  IMPLANT: CNA0T0 23.5    1 DAY POST OP OD   No complaints at this time     PGB TID OD   Last edited by Heladio Genao on 4/12/2024  8:52 AM.            Assessment /Plan     For exam results, see Encounter Report.    Post-operative state    Nuclear sclerotic cataract of right eye      Slit lamp exam:  L/L: nl  K: clear, wound sealed  AC: 1+ cell  Lens: IOL centered and stable    POD1 s/p Phaco/IOL  Appropriate precautions and post op medications reviewed.  Patient instructed to call or come in if symptoms of redness, decreased vision, or pain are experienced.

## 2024-04-18 ENCOUNTER — TELEPHONE (OUTPATIENT)
Dept: OPHTHALMOLOGY | Facility: CLINIC | Age: 79
End: 2024-04-18
Payer: MEDICARE

## 2024-04-18 NOTE — TELEPHONE ENCOUNTER
----- Message from Bianca Dawson sent at 4/18/2024 11:54 AM CDT -----  Regarding: Consult/Advisory  Contact: Dara, patient's sister  Consult/Advisory    Name Of Caller: Dara patient's sister      Contact Preference: [phone]       Nature of call: Patient's sister calling to notify that patient will keep the sx appt May 23, 2024 the original date due to trying to keep working on B/P. Patient will also like to reschedule post op appt for today to Monday afternoon at 2PM. Requesting a call back to confirm appt change.

## 2024-04-22 ENCOUNTER — TELEPHONE (OUTPATIENT)
Dept: OPHTHALMOLOGY | Facility: CLINIC | Age: 79
End: 2024-04-22
Payer: MEDICARE

## 2024-04-22 NOTE — TELEPHONE ENCOUNTER
----- Message from Marlene Lawrence sent at 4/22/2024  2:22 PM CDT -----  Pt was supposed to have her post op on the 18th and was a no show. She said her daughter spoke with someone and she was told that she could come in today.  told her that Dr. Sheehan is in surgery today. She wants to know if he has anything on Thursday.

## 2024-05-15 ENCOUNTER — TELEPHONE (OUTPATIENT)
Dept: OPHTHALMOLOGY | Facility: CLINIC | Age: 79
End: 2024-05-15
Payer: MEDICARE

## 2024-05-15 NOTE — TELEPHONE ENCOUNTER
----- Message from Melania Call sent at 5/15/2024  4:17 PM CDT -----  Regarding: r/s post-op  Pt missed appointment for a post-op today, please call to reschedule. Pt has a surgery scheduled for next week.    [phone]

## 2024-05-20 ENCOUNTER — TELEPHONE (OUTPATIENT)
Dept: OPHTHALMOLOGY | Facility: CLINIC | Age: 79
End: 2024-05-20
Payer: MEDICARE

## 2024-05-20 NOTE — TELEPHONE ENCOUNTER
Patient given arrival time of 8:15 am on Thursday May 23. Nothing to eat are drink after 11:59 on Wednesday night  Start drops into the operative eye today. [address] [facility]

## 2024-05-23 ENCOUNTER — HOSPITAL ENCOUNTER (OUTPATIENT)
Facility: HOSPITAL | Age: 79
Discharge: HOME | End: 2024-05-23
Attending: OPHTHALMOLOGY | Admitting: OPHTHALMOLOGY
Payer: MEDICARE

## 2024-05-23 VITALS
RESPIRATION RATE: 17 BRPM | DIASTOLIC BLOOD PRESSURE: 80 MMHG | TEMPERATURE: 98 F | WEIGHT: 130 LBS | HEIGHT: 60 IN | OXYGEN SATURATION: 97 % | HEART RATE: 88 BPM | BODY MASS INDEX: 25.52 KG/M2 | SYSTOLIC BLOOD PRESSURE: 186 MMHG

## 2024-05-23 DIAGNOSIS — H25.12 NUCLEAR SCLEROTIC CATARACT OF LEFT EYE: Primary | ICD-10-CM

## 2024-05-23 PROCEDURE — 36000707: Performed by: OPHTHALMOLOGY

## 2024-05-23 PROCEDURE — 63600175 PHARM REV CODE 636 W HCPCS: Performed by: OPHTHALMOLOGY

## 2024-05-23 PROCEDURE — 36000706: Performed by: OPHTHALMOLOGY

## 2024-05-23 PROCEDURE — 25000003 PHARM REV CODE 250: Performed by: OPHTHALMOLOGY

## 2024-05-23 PROCEDURE — 71000015 HC POSTOP RECOV 1ST HR: Performed by: OPHTHALMOLOGY

## 2024-05-23 PROCEDURE — 99900035 HC TECH TIME PER 15 MIN (STAT)

## 2024-05-23 PROCEDURE — 94761 N-INVAS EAR/PLS OXIMETRY MLT: CPT

## 2024-05-23 PROCEDURE — V2632 POST CHMBR INTRAOCULAR LENS: HCPCS | Performed by: OPHTHALMOLOGY

## 2024-05-23 PROCEDURE — 99152 MOD SED SAME PHYS/QHP 5/>YRS: CPT | Performed by: OPHTHALMOLOGY

## 2024-05-23 PROCEDURE — 66982 XCAPSL CTRC RMVL CPLX WO ECP: CPT | Mod: 79,LT,, | Performed by: OPHTHALMOLOGY

## 2024-05-23 DEVICE — LENS CLAREON AUTONOME 24.0D: Type: IMPLANTABLE DEVICE | Site: EYE | Status: FUNCTIONAL

## 2024-05-23 RX ORDER — LIDOCAINE HYDROCHLORIDE 40 MG/ML
INJECTION, SOLUTION RETROBULBAR
Status: DISCONTINUED | OUTPATIENT
Start: 2024-05-23 | End: 2024-05-23 | Stop reason: HOSPADM

## 2024-05-23 RX ORDER — TROPICAMIDE 10 MG/ML
1 SOLUTION/ DROPS OPHTHALMIC
Status: COMPLETED | OUTPATIENT
Start: 2024-05-23 | End: 2024-05-23

## 2024-05-23 RX ORDER — ACETAMINOPHEN 325 MG/1
650 TABLET ORAL EVERY 4 HOURS PRN
Status: DISCONTINUED | OUTPATIENT
Start: 2024-05-23 | End: 2024-05-23 | Stop reason: HOSPADM

## 2024-05-23 RX ORDER — MOXIFLOXACIN 5 MG/ML
1 SOLUTION/ DROPS OPHTHALMIC
Status: COMPLETED | OUTPATIENT
Start: 2024-05-23 | End: 2024-05-23

## 2024-05-23 RX ORDER — TETRACAINE HYDROCHLORIDE 5 MG/ML
1 SOLUTION OPHTHALMIC
Status: DISCONTINUED | OUTPATIENT
Start: 2024-05-23 | End: 2024-05-23

## 2024-05-23 RX ORDER — PROPARACAINE HYDROCHLORIDE 5 MG/ML
1 SOLUTION/ DROPS OPHTHALMIC
Status: DISCONTINUED | OUTPATIENT
Start: 2024-05-23 | End: 2024-05-23 | Stop reason: HOSPADM

## 2024-05-23 RX ORDER — SODIUM CHLORIDE 0.9 % (FLUSH) 0.9 %
10 SYRINGE (ML) INJECTION
Status: DISCONTINUED | OUTPATIENT
Start: 2024-05-23 | End: 2024-05-23 | Stop reason: HOSPADM

## 2024-05-23 RX ORDER — MIDAZOLAM HYDROCHLORIDE 1 MG/ML
1 INJECTION, SOLUTION INTRAMUSCULAR; INTRAVENOUS
Status: DISCONTINUED | OUTPATIENT
Start: 2024-05-23 | End: 2024-05-23 | Stop reason: HOSPADM

## 2024-05-23 RX ORDER — MOXIFLOXACIN 5 MG/ML
SOLUTION/ DROPS OPHTHALMIC
Status: DISCONTINUED | OUTPATIENT
Start: 2024-05-23 | End: 2024-05-23 | Stop reason: HOSPADM

## 2024-05-23 RX ORDER — PHENYLEPHRINE HYDROCHLORIDE 25 MG/ML
1 SOLUTION/ DROPS OPHTHALMIC
Status: COMPLETED | OUTPATIENT
Start: 2024-05-23 | End: 2024-05-23

## 2024-05-23 RX ORDER — MOXIFLOXACIN 5 MG/ML
1 SOLUTION/ DROPS OPHTHALMIC
Status: DISCONTINUED | OUTPATIENT
Start: 2024-05-23 | End: 2024-05-23 | Stop reason: HOSPADM

## 2024-05-23 RX ORDER — TETRACAINE HYDROCHLORIDE 5 MG/ML
1 SOLUTION OPHTHALMIC
Status: COMPLETED | OUTPATIENT
Start: 2024-05-23 | End: 2024-05-23

## 2024-05-23 RX ORDER — PHENYLEPHRINE HYDROCHLORIDE 100 MG/ML
1 SOLUTION/ DROPS OPHTHALMIC
Status: DISCONTINUED | OUTPATIENT
Start: 2024-05-23 | End: 2024-05-23 | Stop reason: HOSPADM

## 2024-05-23 RX ADMIN — TETRACAINE HYDROCHLORIDE 1 DROP: 5 SOLUTION OPHTHALMIC at 09:05

## 2024-05-23 RX ADMIN — MOXIFLOXACIN OPHTHALMIC 1 DROP: 5 SOLUTION/ DROPS OPHTHALMIC at 09:05

## 2024-05-23 RX ADMIN — TROPICAMIDE 1 DROP: 10 SOLUTION/ DROPS OPHTHALMIC at 09:05

## 2024-05-23 RX ADMIN — MIDAZOLAM HYDROCHLORIDE 2 MG: 1 INJECTION, SOLUTION INTRAMUSCULAR; INTRAVENOUS at 10:05

## 2024-05-23 RX ADMIN — MOXIFLOXACIN 1 DROP: 5 SOLUTION/ DROPS OPHTHALMIC at 09:05

## 2024-05-23 RX ADMIN — PHENYLEPHRINE HYDROCHLORIDE 1 DROP: 25 SOLUTION/ DROPS OPHTHALMIC at 09:05

## 2024-05-23 RX ADMIN — TETRACAINE HYDROCHLORIDE 1 DROP: 5 SOLUTION OPHTHALMIC at 08:05

## 2024-05-23 RX ADMIN — MOXIFLOXACIN 1 DROP: 5 SOLUTION/ DROPS OPHTHALMIC at 10:05

## 2024-05-23 RX ADMIN — TROPICAMIDE 1 DROP: 10 SOLUTION/ DROPS OPHTHALMIC at 08:05

## 2024-05-23 RX ADMIN — PHENYLEPHRINE HYDROCHLORIDE 1 DROP: 25 SOLUTION/ DROPS OPHTHALMIC at 08:05

## 2024-05-23 RX ADMIN — MOXIFLOXACIN OPHTHALMIC 1 DROP: 5 SOLUTION/ DROPS OPHTHALMIC at 08:05

## 2024-05-23 NOTE — PLAN OF CARE
Notified Dr. Sheehan of patient noted with a new onset Afib with no symptoms and the rhythm changed  back into SR with occasional PVCs. Patient denies any c/o symptoms. No new orders noted

## 2024-05-23 NOTE — OP NOTE
SURGEON:  Demario Sheehan M.D.    PREOPERATIVE DIAGNOSIS:    Nuclear Sclerotic Cataract    POSTOPERATIVE DIAGNOSIS:    Nuclear Sclerotic Cataract    PROCEDURES:    Complex Phacoemulsification with  intraocular lens, left eye (11899)    DATE OF SURGERY: 05/23/2024    IMPLANT: cna0t0 24.0    ANESTHESIA:  Moderate sedation with topical Lidocaine. Patient ID confirmed and re-evaluated the patient and anesthesia plan confirming it is suitable for the patient's condition and procedure.    COMPLICATIONS:  None    ESTIMATED BLOOD LOSS: None    SPECIMENS: None    INDICATIONS:    The patient has a history of painless progressive visual loss and difficulty with activities of daily living, which specifically include difficult driving at night due to glare and difficulty reading small print, secondary to cataract formation. After a thorough discussion of the risks, benefits, and alternatives to cataract surgery, including, but not limited to, the rare risks of infection, retinal detachment, hemorrhage, need for additional surgery, loss of vision, and even loss of the eye, the patient voices understanding and desires to proceed.    DESCRIPTION OF PROCEDURE:    The patients IOL calculations were reviewed, and the lens selection confirmed.   After verification and marking of the proper eye in the preop holding area, the patient was brought to the operating room in supine position where the eye was prepped and draped in standard sterile fashion with 5% Betadine and a lid speculum placed in the eye.   Topical 4% Lidocaine was used in addition to the preoperative anesthesia and the procedure was begun by the creation of a paracentesis incision through which viscoelastic was used to fill the anterior chamber.  Next, a keratome blade was used to create a triplanar temporal clear corneal incision and a cystotome and Utrata forceps used to fashion a continuous curvilinear capsulorrhexis.  Hydrodissection was carried out using the Martel  hydrodissection cannula and the nucleus was found to be mobile.  Phacoemulsification of the nucleus was carried out using a quick chop technique, and all remaining epinuclear and cortical material was removed.  The eye was then reformed with Viscoelastic and the  intraocular lens was implanted into the capsular bag.  All remaining viscoelastics were removed from the eye and at the end of the case the pupil was round, the lens was well-centered within the capsular bag and all wounds were found to be water tight.  Drops of Vigamox and Pred Forte were instilled and a shield was placed over the eye. The patient will follow up with Dr. Sheehan in the morning.    ADDENDUM: Because the patient had a dense cataract and loss of red reflex, trypan blue was used to stain the anterior capsule.

## 2024-05-23 NOTE — H&P
CC: Painless, progressive loss of vision  Present Illness: Nuclear sclerotic cataract  Allergies/Current Meds: see meds  Mental Status: A&O x3  Pertinent Medical History: n/a     Physical Exam  General: NAD  HEENT: Eye white/quiet  Lungs: Adequate respirations  Heart: + pulses  Abdomen: soft  Rectal/GI/: deferred     Impression: Cataract  Plan: Phacoemulsification with lens implant    ASA Score: II    Mallampati Score: II    Plan for Sedation: Moderate Sedation    Patient or Family History of Anesthesia problems : No    Any changes affecting the anesthesia assessment which may have changed since the initial assessment and H and P: No     
no

## 2024-05-23 NOTE — DISCHARGE INSTRUCTIONS
CATARACT SURGERY    POST-OPERATIVE INSTRUCTIONS    · Apply drops THREE times a day into operative eye for 30 days.    · DO NOT rub your eye    · Wear protective sunglasses during the day    · Resume moderate activity    · Bathe/shower/wash face normally    · DO NOT apply makeup around the operative eye for 1 week.         You should expect    - Blurry vision and halos for 24-48 hours    - Dilated pupil for 24-48 hours    - Scratchy feeling in the eye for 1-2 days    - Curved shadow in your peripheral vision for 2-3 weeks    - Occasional flickering of lights for up to 1 week    -If you experience severe pain or nausea, please call Dr Sheehan or the on-call doctor at [phone]    - Plan to see Dr Sheehan tomorrow .      [facility]    [address] [facility] [address]    ** Most patients can drive the next day, but if you do not feel comfortable driving, please arrange for transportation.

## 2024-05-23 NOTE — DISCHARGE SUMMARY
Outcome: Successful outpatient ophthalmic surgical procedure  Preprinted Instructions given to patient.  Regular diet.  Activity: No restrictions  Meds: see Med Rec  Condition: stable  Follow up: 1 day with Dr Sheehan  Disposition: Home  Diagnosis: s/p eye surgery  Date of discharge: 05/23/2024

## 2024-05-24 ENCOUNTER — OFFICE VISIT (OUTPATIENT)
Dept: OPHTHALMOLOGY | Facility: CLINIC | Age: 79
End: 2024-05-24
Payer: MEDICARE

## 2024-05-24 DIAGNOSIS — Z98.890 POST-OPERATIVE STATE: ICD-10-CM

## 2024-05-24 DIAGNOSIS — H25.13 NUCLEAR SCLEROSIS, BILATERAL: Primary | ICD-10-CM

## 2024-05-24 PROCEDURE — 1160F RVW MEDS BY RX/DR IN RCRD: CPT | Mod: CPTII,S$GLB,, | Performed by: OPHTHALMOLOGY

## 2024-05-24 PROCEDURE — 99999 PR PBB SHADOW E&M-EST. PATIENT-LVL II: CPT | Mod: PBBFAC,,, | Performed by: OPHTHALMOLOGY

## 2024-05-24 PROCEDURE — 1101F PT FALLS ASSESS-DOCD LE1/YR: CPT | Mod: CPTII,S$GLB,, | Performed by: OPHTHALMOLOGY

## 2024-05-24 PROCEDURE — 3288F FALL RISK ASSESSMENT DOCD: CPT | Mod: CPTII,S$GLB,, | Performed by: OPHTHALMOLOGY

## 2024-05-24 PROCEDURE — 1159F MED LIST DOCD IN RCRD: CPT | Mod: CPTII,S$GLB,, | Performed by: OPHTHALMOLOGY

## 2024-05-24 PROCEDURE — 99024 POSTOP FOLLOW-UP VISIT: CPT | Mod: S$GLB,,, | Performed by: OPHTHALMOLOGY

## 2024-05-24 NOTE — PROGRESS NOTES
HPI     1 DAY POST OP   OS             Comments: PCIOL OS   S/P  1 DAY OS   POST OP      GTTS   MOXI MIX OS  TID           Comments    DATE OF SURGERY: 04/11/2024  IMPLANT: CNA0T0 23.5  DLS   04/12/24  1 DAY POST OP OD   No complaints at this time     PGB TID OD           Last edited by Mandy Caban on 5/24/2024 10:31 AM.            Assessment /Plan     For exam results, see Encounter Report.    Nuclear sclerosis, bilateral    Post-operative state      Slit lamp exam:  L/L: nl  K: clear, wound sealed  AC: 1+ cell  Lens: IOL centered and stable    POD1 s/p Phaco/IOL  Appropriate precautions and post op medications reviewed.  Patient instructed to call or come in if symptoms of redness, decreased vision, or pain are experienced.

## (undated) DEVICE — SOL POVIDONE SCRUB IODINE 4 OZ

## (undated) DEVICE — Device

## (undated) DEVICE — SOL BETADINE 5%

## (undated) DEVICE — GLOVE SENSICARE PI SURG 7.5